# Patient Record
Sex: FEMALE | Race: OTHER | NOT HISPANIC OR LATINO
[De-identification: names, ages, dates, MRNs, and addresses within clinical notes are randomized per-mention and may not be internally consistent; named-entity substitution may affect disease eponyms.]

---

## 2017-01-05 ENCOUNTER — APPOINTMENT (OUTPATIENT)
Dept: INFECTIOUS DISEASE | Facility: CLINIC | Age: 77
End: 2017-01-05

## 2017-01-05 DIAGNOSIS — Z71.89 OTHER SPECIFIED COUNSELING: ICD-10-CM

## 2017-04-17 ENCOUNTER — APPOINTMENT (OUTPATIENT)
Dept: OBGYN | Facility: CLINIC | Age: 77
End: 2017-04-17

## 2017-04-17 VITALS
SYSTOLIC BLOOD PRESSURE: 121 MMHG | HEIGHT: 66 IN | BODY MASS INDEX: 26.2 KG/M2 | DIASTOLIC BLOOD PRESSURE: 73 MMHG | HEART RATE: 71 BPM | WEIGHT: 163 LBS

## 2017-04-18 LAB
APPEARANCE: CLEAR
BACTERIA: NEGATIVE
BILIRUBIN URINE: ABNORMAL
BLOOD URINE: NEGATIVE
CALCIUM OXALATE CRYSTALS: ABNORMAL
COLOR: ABNORMAL
GLUCOSE QUALITATIVE U: NORMAL MG/DL
HPV HIGH+LOW RISK DNA PNL CVX: POSITIVE
HYALINE CASTS: 0 /LPF
KETONES URINE: ABNORMAL
LEUKOCYTE ESTERASE URINE: NEGATIVE
MICROSCOPIC-UA: NORMAL
NITRITE URINE: NEGATIVE
PH URINE: 6.5
PROTEIN URINE: NEGATIVE MG/DL
RED BLOOD CELLS URINE: 2 /HPF
SPECIFIC GRAVITY URINE: 1.02
SQUAMOUS EPITHELIAL CELLS: 1 /HPF
UROBILINOGEN URINE: 1 MG/DL
WHITE BLOOD CELLS URINE: 1 /HPF

## 2017-04-19 LAB — BACTERIA UR CULT: NORMAL

## 2017-04-24 LAB — CYTOLOGY CVX/VAG DOC THIN PREP: NORMAL

## 2017-04-25 ENCOUNTER — OTHER (OUTPATIENT)
Age: 77
End: 2017-04-25

## 2017-04-25 ENCOUNTER — LABORATORY RESULT (OUTPATIENT)
Age: 77
End: 2017-04-25

## 2017-05-18 ENCOUNTER — APPOINTMENT (OUTPATIENT)
Dept: OBGYN | Facility: CLINIC | Age: 77
End: 2017-05-18

## 2017-05-18 VITALS
DIASTOLIC BLOOD PRESSURE: 82 MMHG | HEART RATE: 73 BPM | SYSTOLIC BLOOD PRESSURE: 123 MMHG | HEIGHT: 66 IN | BODY MASS INDEX: 25.88 KG/M2 | WEIGHT: 161 LBS

## 2017-05-24 LAB — CORE LAB BIOPSY: NORMAL

## 2017-09-08 ENCOUNTER — APPOINTMENT (OUTPATIENT)
Dept: ULTRASOUND IMAGING | Facility: IMAGING CENTER | Age: 77
End: 2017-09-08
Payer: MEDICARE

## 2017-09-08 ENCOUNTER — OUTPATIENT (OUTPATIENT)
Dept: OUTPATIENT SERVICES | Facility: HOSPITAL | Age: 77
LOS: 1 days | End: 2017-09-08
Payer: MEDICARE

## 2017-09-08 ENCOUNTER — APPOINTMENT (OUTPATIENT)
Dept: RADIOLOGY | Facility: IMAGING CENTER | Age: 77
End: 2017-09-08
Payer: MEDICARE

## 2017-09-08 ENCOUNTER — APPOINTMENT (OUTPATIENT)
Dept: MAMMOGRAPHY | Facility: IMAGING CENTER | Age: 77
End: 2017-09-08
Payer: MEDICARE

## 2017-09-08 DIAGNOSIS — Z00.8 ENCOUNTER FOR OTHER GENERAL EXAMINATION: ICD-10-CM

## 2017-09-08 PROCEDURE — 76830 TRANSVAGINAL US NON-OB: CPT | Mod: 26

## 2017-09-08 PROCEDURE — 77063 BREAST TOMOSYNTHESIS BI: CPT | Mod: 26

## 2017-09-08 PROCEDURE — 76856 US EXAM PELVIC COMPLETE: CPT | Mod: 26

## 2017-09-08 PROCEDURE — 77067 SCR MAMMO BI INCL CAD: CPT

## 2017-09-08 PROCEDURE — 76856 US EXAM PELVIC COMPLETE: CPT

## 2017-09-08 PROCEDURE — 77080 DXA BONE DENSITY AXIAL: CPT | Mod: 26

## 2017-09-08 PROCEDURE — 77063 BREAST TOMOSYNTHESIS BI: CPT

## 2017-09-08 PROCEDURE — 77080 DXA BONE DENSITY AXIAL: CPT

## 2017-09-08 PROCEDURE — G0202: CPT | Mod: 26

## 2017-09-08 PROCEDURE — 76830 TRANSVAGINAL US NON-OB: CPT

## 2017-09-14 DIAGNOSIS — M85.89 OTHER SPECIFIED DISORDERS OF BONE DENSITY AND STRUCTURE, MULTIPLE SITES: ICD-10-CM

## 2017-09-14 DIAGNOSIS — Z80.3 FAMILY HISTORY OF MALIGNANT NEOPLASM OF BREAST: ICD-10-CM

## 2017-09-14 DIAGNOSIS — N85.8 OTHER SPECIFIED NONINFLAMMATORY DISORDERS OF UTERUS: ICD-10-CM

## 2017-09-14 DIAGNOSIS — Z12.31 ENCOUNTER FOR SCREENING MAMMOGRAM FOR MALIGNANT NEOPLASM OF BREAST: ICD-10-CM

## 2017-10-20 ENCOUNTER — LABORATORY RESULT (OUTPATIENT)
Age: 77
End: 2017-10-20

## 2017-10-20 ENCOUNTER — APPOINTMENT (OUTPATIENT)
Dept: OBGYN | Facility: CLINIC | Age: 77
End: 2017-10-20
Payer: MEDICARE

## 2017-10-20 VITALS
HEART RATE: 77 BPM | DIASTOLIC BLOOD PRESSURE: 78 MMHG | BODY MASS INDEX: 25.88 KG/M2 | SYSTOLIC BLOOD PRESSURE: 120 MMHG | HEIGHT: 66 IN | WEIGHT: 161 LBS

## 2017-10-20 PROCEDURE — 99214 OFFICE O/P EST MOD 30 MIN: CPT

## 2017-10-23 LAB
APPEARANCE: CLEAR
BACTERIA UR CULT: NORMAL
BACTERIA: NEGATIVE
BILIRUBIN URINE: NEGATIVE
BLOOD URINE: NEGATIVE
CANDIDA VAG CYTO: NOT DETECTED
COLOR: YELLOW
G VAGINALIS+PREV SP MTYP VAG QL MICRO: NOT DETECTED
GLUCOSE QUALITATIVE U: NEGATIVE MG/DL
KETONES URINE: NEGATIVE
LEUKOCYTE ESTERASE URINE: NEGATIVE
MICROSCOPIC-UA: NORMAL
NITRITE URINE: NEGATIVE
PH URINE: 5.5
PROTEIN URINE: NEGATIVE MG/DL
RED BLOOD CELLS URINE: 1 /HPF
SPECIFIC GRAVITY URINE: 1.01
SQUAMOUS EPITHELIAL CELLS: 0 /HPF
T VAGINALIS VAG QL WET PREP: NOT DETECTED
UROBILINOGEN URINE: NEGATIVE MG/DL
WHITE BLOOD CELLS URINE: 0 /HPF

## 2017-10-25 LAB — CYTOLOGY CVX/VAG DOC THIN PREP: NORMAL

## 2017-10-26 LAB — CORE LAB FLUID CYTOLOGY: NORMAL

## 2017-10-27 ENCOUNTER — OTHER (OUTPATIENT)
Age: 77
End: 2017-10-27

## 2018-01-25 ENCOUNTER — APPOINTMENT (OUTPATIENT)
Dept: VASCULAR SURGERY | Facility: CLINIC | Age: 78
End: 2018-01-25
Payer: MEDICARE

## 2018-01-25 ENCOUNTER — MOBILE ON CALL (OUTPATIENT)
Age: 78
End: 2018-01-25

## 2018-01-25 VITALS
SYSTOLIC BLOOD PRESSURE: 144 MMHG | HEART RATE: 69 BPM | TEMPERATURE: 97.5 F | WEIGHT: 160 LBS | BODY MASS INDEX: 25.71 KG/M2 | HEIGHT: 66 IN | DIASTOLIC BLOOD PRESSURE: 80 MMHG

## 2018-01-25 VITALS — SYSTOLIC BLOOD PRESSURE: 142 MMHG | DIASTOLIC BLOOD PRESSURE: 90 MMHG | HEART RATE: 64 BPM

## 2018-01-25 DIAGNOSIS — R60.0 LOCALIZED EDEMA: ICD-10-CM

## 2018-01-25 PROCEDURE — 99203 OFFICE O/P NEW LOW 30 MIN: CPT

## 2018-01-25 PROCEDURE — 93970 EXTREMITY STUDY: CPT

## 2018-01-25 PROCEDURE — 93923 UPR/LXTR ART STDY 3+ LVLS: CPT

## 2018-02-02 PROBLEM — R60.0 BILATERAL LEG EDEMA: Status: ACTIVE | Noted: 2018-02-02

## 2018-04-09 ENCOUNTER — RX RENEWAL (OUTPATIENT)
Age: 78
End: 2018-04-09

## 2018-04-09 ENCOUNTER — APPOINTMENT (OUTPATIENT)
Dept: OBGYN | Facility: CLINIC | Age: 78
End: 2018-04-09
Payer: MEDICARE

## 2018-04-09 VITALS
HEART RATE: 71 BPM | HEIGHT: 66 IN | SYSTOLIC BLOOD PRESSURE: 146 MMHG | WEIGHT: 162 LBS | BODY MASS INDEX: 26.03 KG/M2 | DIASTOLIC BLOOD PRESSURE: 78 MMHG

## 2018-04-09 DIAGNOSIS — R87.619 UNSPECIFIED ABNORMAL CYTOLOGICAL FINDINGS IN SPECIMENS FROM CERVIX UTERI: ICD-10-CM

## 2018-04-09 PROCEDURE — 99214 OFFICE O/P EST MOD 30 MIN: CPT | Mod: 25

## 2018-04-09 PROCEDURE — G0101: CPT

## 2018-04-09 RX ORDER — ALPRAZOLAM 0.5 MG/1
0.5 TABLET ORAL
Qty: 2 | Refills: 0 | Status: COMPLETED | COMMUNITY
Start: 2018-04-09 | End: 2018-04-10

## 2018-04-13 ENCOUNTER — APPOINTMENT (OUTPATIENT)
Dept: VASCULAR SURGERY | Facility: CLINIC | Age: 78
End: 2018-04-13
Payer: MEDICARE

## 2018-04-13 LAB — CYTOLOGY CVX/VAG DOC THIN PREP: NORMAL

## 2018-04-13 PROCEDURE — 36475 ENDOVENOUS RF 1ST VEIN: CPT | Mod: RT

## 2018-04-14 LAB
HPV DNA HIGH RISK: NOT DETECTED
HPV DNA LOW RISK: NOT DETECTED

## 2018-04-16 ENCOUNTER — APPOINTMENT (OUTPATIENT)
Dept: VASCULAR SURGERY | Facility: CLINIC | Age: 78
End: 2018-04-16
Payer: MEDICARE

## 2018-04-16 PROCEDURE — 93971 EXTREMITY STUDY: CPT

## 2018-05-04 ENCOUNTER — RX RENEWAL (OUTPATIENT)
Age: 78
End: 2018-05-04

## 2018-05-04 RX ORDER — LIDOCAINE HYDROCHLORIDE 10 MG/ML
1 INJECTION, SOLUTION INFILTRATION; PERINEURAL
Qty: 50 | Refills: 0 | Status: COMPLETED | COMMUNITY
Start: 2018-04-09 | End: 2018-05-04

## 2018-05-04 RX ORDER — SODIUM BICARBONATE 84 MG/ML
8.4 INJECTION, SOLUTION INTRAVENOUS
Qty: 5 | Refills: 0 | Status: COMPLETED | COMMUNITY
Start: 2018-04-09 | End: 2018-05-04

## 2018-05-04 RX ORDER — SODIUM CHLORIDE 9 G/ML
0.9 INJECTION, SOLUTION INTRAVENOUS
Qty: 500 | Refills: 0 | Status: COMPLETED | COMMUNITY
Start: 2018-04-09 | End: 2018-05-04

## 2018-05-11 ENCOUNTER — APPOINTMENT (OUTPATIENT)
Dept: VASCULAR SURGERY | Facility: CLINIC | Age: 78
End: 2018-05-11
Payer: MEDICARE

## 2018-05-11 PROCEDURE — 36475 ENDOVENOUS RF 1ST VEIN: CPT | Mod: LT

## 2018-05-14 ENCOUNTER — APPOINTMENT (OUTPATIENT)
Dept: VASCULAR SURGERY | Facility: CLINIC | Age: 78
End: 2018-05-14
Payer: MEDICARE

## 2018-05-14 PROCEDURE — 93971 EXTREMITY STUDY: CPT

## 2018-05-17 ENCOUNTER — MOBILE ON CALL (OUTPATIENT)
Age: 78
End: 2018-05-17

## 2018-06-07 ENCOUNTER — APPOINTMENT (OUTPATIENT)
Dept: VASCULAR SURGERY | Facility: CLINIC | Age: 78
End: 2018-06-07
Payer: MEDICARE

## 2018-06-07 VITALS — DIASTOLIC BLOOD PRESSURE: 78 MMHG | HEART RATE: 69 BPM | SYSTOLIC BLOOD PRESSURE: 124 MMHG

## 2018-06-07 VITALS
WEIGHT: 160 LBS | DIASTOLIC BLOOD PRESSURE: 80 MMHG | HEART RATE: 62 BPM | HEIGHT: 66 IN | BODY MASS INDEX: 25.71 KG/M2 | TEMPERATURE: 98 F | SYSTOLIC BLOOD PRESSURE: 128 MMHG

## 2018-06-07 PROCEDURE — 99212 OFFICE O/P EST SF 10 MIN: CPT

## 2018-06-11 ENCOUNTER — MESSAGE (OUTPATIENT)
Age: 78
End: 2018-06-11

## 2018-06-13 ENCOUNTER — MESSAGE (OUTPATIENT)
Age: 78
End: 2018-06-13

## 2018-06-18 ENCOUNTER — APPOINTMENT (OUTPATIENT)
Dept: VASCULAR SURGERY | Facility: CLINIC | Age: 78
End: 2018-06-18

## 2018-06-29 ENCOUNTER — APPOINTMENT (OUTPATIENT)
Dept: VASCULAR SURGERY | Facility: CLINIC | Age: 78
End: 2018-06-29
Payer: MEDICARE

## 2018-06-29 PROCEDURE — 99215 OFFICE O/P EST HI 40 MIN: CPT

## 2018-07-06 ENCOUNTER — MESSAGE (OUTPATIENT)
Age: 78
End: 2018-07-06

## 2018-08-03 ENCOUNTER — APPOINTMENT (OUTPATIENT)
Dept: VASCULAR SURGERY | Facility: CLINIC | Age: 78
End: 2018-08-03
Payer: MEDICARE

## 2018-08-03 ENCOUNTER — NON-APPOINTMENT (OUTPATIENT)
Age: 78
End: 2018-08-03

## 2018-08-03 VITALS
TEMPERATURE: 97.9 F | HEIGHT: 66 IN | DIASTOLIC BLOOD PRESSURE: 77 MMHG | HEART RATE: 67 BPM | SYSTOLIC BLOOD PRESSURE: 122 MMHG

## 2018-08-03 PROCEDURE — 99213 OFFICE O/P EST LOW 20 MIN: CPT

## 2018-09-10 ENCOUNTER — APPOINTMENT (OUTPATIENT)
Dept: ULTRASOUND IMAGING | Facility: IMAGING CENTER | Age: 78
End: 2018-09-10

## 2018-09-10 ENCOUNTER — APPOINTMENT (OUTPATIENT)
Dept: MAMMOGRAPHY | Facility: IMAGING CENTER | Age: 78
End: 2018-09-10

## 2018-09-24 ENCOUNTER — APPOINTMENT (OUTPATIENT)
Dept: VASCULAR SURGERY | Facility: CLINIC | Age: 78
End: 2018-09-24
Payer: MEDICARE

## 2018-09-24 PROCEDURE — 99213 OFFICE O/P EST LOW 20 MIN: CPT

## 2018-10-01 ENCOUNTER — APPOINTMENT (OUTPATIENT)
Dept: MAMMOGRAPHY | Facility: IMAGING CENTER | Age: 78
End: 2018-10-01
Payer: MEDICARE

## 2018-10-01 ENCOUNTER — OUTPATIENT (OUTPATIENT)
Dept: OUTPATIENT SERVICES | Facility: HOSPITAL | Age: 78
LOS: 1 days | End: 2018-10-01
Payer: MEDICARE

## 2018-10-01 ENCOUNTER — APPOINTMENT (OUTPATIENT)
Dept: RADIOLOGY | Facility: IMAGING CENTER | Age: 78
End: 2018-10-01
Payer: MEDICARE

## 2018-10-01 DIAGNOSIS — Z00.8 ENCOUNTER FOR OTHER GENERAL EXAMINATION: ICD-10-CM

## 2018-10-01 PROCEDURE — 77063 BREAST TOMOSYNTHESIS BI: CPT | Mod: 26

## 2018-10-01 PROCEDURE — 77067 SCR MAMMO BI INCL CAD: CPT

## 2018-10-01 PROCEDURE — 77067 SCR MAMMO BI INCL CAD: CPT | Mod: 26

## 2018-10-01 PROCEDURE — 77063 BREAST TOMOSYNTHESIS BI: CPT

## 2018-10-11 ENCOUNTER — OUTPATIENT (OUTPATIENT)
Dept: OUTPATIENT SERVICES | Facility: HOSPITAL | Age: 78
LOS: 1 days | End: 2018-10-11
Payer: MEDICARE

## 2018-10-11 ENCOUNTER — APPOINTMENT (OUTPATIENT)
Dept: ULTRASOUND IMAGING | Facility: IMAGING CENTER | Age: 78
End: 2018-10-11
Payer: MEDICARE

## 2018-10-11 DIAGNOSIS — Z00.8 ENCOUNTER FOR OTHER GENERAL EXAMINATION: ICD-10-CM

## 2018-10-11 PROCEDURE — 76830 TRANSVAGINAL US NON-OB: CPT

## 2018-10-11 PROCEDURE — 76856 US EXAM PELVIC COMPLETE: CPT | Mod: 26

## 2018-10-11 PROCEDURE — 76856 US EXAM PELVIC COMPLETE: CPT

## 2018-10-11 PROCEDURE — 76830 TRANSVAGINAL US NON-OB: CPT | Mod: 26

## 2018-10-22 ENCOUNTER — APPOINTMENT (OUTPATIENT)
Dept: VASCULAR SURGERY | Facility: CLINIC | Age: 78
End: 2018-10-22
Payer: MEDICARE

## 2018-10-22 VITALS
WEIGHT: 159 LBS | DIASTOLIC BLOOD PRESSURE: 80 MMHG | BODY MASS INDEX: 25.66 KG/M2 | SYSTOLIC BLOOD PRESSURE: 126 MMHG | HEART RATE: 64 BPM

## 2018-10-22 DIAGNOSIS — I78.1 NEVUS, NON-NEOPLASTIC: ICD-10-CM

## 2018-10-22 DIAGNOSIS — I83.893 VARICOSE VEINS OF BILATERAL LOWER EXTREMITIES WITH OTHER COMPLICATIONS: ICD-10-CM

## 2018-10-22 PROCEDURE — 99212 OFFICE O/P EST SF 10 MIN: CPT

## 2018-10-29 ENCOUNTER — APPOINTMENT (OUTPATIENT)
Dept: OBGYN | Facility: CLINIC | Age: 78
End: 2018-10-29
Payer: MEDICARE

## 2018-10-29 VITALS
WEIGHT: 162 LBS | HEART RATE: 67 BPM | HEIGHT: 66 IN | BODY MASS INDEX: 26.03 KG/M2 | DIASTOLIC BLOOD PRESSURE: 82 MMHG | SYSTOLIC BLOOD PRESSURE: 132 MMHG

## 2018-10-29 PROCEDURE — 99214 OFFICE O/P EST MOD 30 MIN: CPT

## 2018-10-30 ENCOUNTER — CLINICAL ADVICE (OUTPATIENT)
Age: 78
End: 2018-10-30

## 2018-10-30 LAB
APPEARANCE: CLEAR
BACTERIA: NEGATIVE
BILIRUBIN URINE: NEGATIVE
BLOOD URINE: NEGATIVE
COLOR: YELLOW
GLUCOSE QUALITATIVE U: NEGATIVE MG/DL
HPV HIGH+LOW RISK DNA PNL CVX: DETECTED
KETONES URINE: NEGATIVE
LEUKOCYTE ESTERASE URINE: NEGATIVE
MICROSCOPIC-UA: NORMAL
NITRITE URINE: NEGATIVE
PH URINE: 5
PROTEIN URINE: NEGATIVE MG/DL
RED BLOOD CELLS URINE: 1 /HPF
SPECIFIC GRAVITY URINE: 1.02
SQUAMOUS EPITHELIAL CELLS: 0 /HPF
UROBILINOGEN URINE: NEGATIVE MG/DL
WHITE BLOOD CELLS URINE: 1 /HPF

## 2018-10-31 LAB — BACTERIA UR CULT: NORMAL

## 2018-11-01 LAB — CORE LAB FLUID CYTOLOGY: NORMAL

## 2018-11-04 LAB — CYTOLOGY CVX/VAG DOC THIN PREP: NORMAL

## 2018-11-06 ENCOUNTER — OTHER (OUTPATIENT)
Age: 78
End: 2018-11-06

## 2018-11-20 PROBLEM — I83.893 VARICOSE VEINS OF BILATERAL LOWER EXTREMITIES WITH OTHER COMPLICATIONS: Status: ACTIVE | Noted: 2018-02-02

## 2018-11-20 PROBLEM — I78.1 SPIDER VEINS: Status: ACTIVE | Noted: 2018-06-29

## 2019-04-22 ENCOUNTER — APPOINTMENT (OUTPATIENT)
Dept: OBGYN | Facility: CLINIC | Age: 79
End: 2019-04-22
Payer: MEDICARE

## 2019-04-22 VITALS
WEIGHT: 159 LBS | DIASTOLIC BLOOD PRESSURE: 81 MMHG | SYSTOLIC BLOOD PRESSURE: 130 MMHG | BODY MASS INDEX: 25.55 KG/M2 | HEART RATE: 69 BPM | HEIGHT: 66 IN

## 2019-04-22 PROCEDURE — 99214 OFFICE O/P EST MOD 30 MIN: CPT

## 2019-04-22 RX ORDER — ALPRAZOLAM 0.5 MG/1
0.5 TABLET ORAL
Qty: 1 | Refills: 0 | Status: DISCONTINUED | COMMUNITY
Start: 2018-05-04 | End: 2019-04-22

## 2019-04-22 NOTE — PHYSICAL EXAM
[No Acute Distress] : in no acute distress [Well developed] : well developed [Well Nourished] : ~L well nourished [Oriented x3] : oriented to person, place, and time [Good Hygeine] : demonstrates good hygeine [Normal Memory] : ~T memory was ~L unimpaired [Normal Mood/Affect] : mood and affect are normal [Normal Lung Sounds] : the lungs were clear to auscultation [Rate & Rhythm Regular] : ~T heart rate and rhythm were normal [Respirations regular] : ~T respiratory rate was regular [No Edema] : ~T edema was not present [Supple] : ~T the neck demonstrated no ~M decrease in suppleness [Symmetrical] : the neck was ~L symmetrical [Thyroid Normal] : the thyroid ~T showed no abnormalities [Warm and Dry] : was warm and dry to touch [Turgor Normal] : skin turgor ~T was normal [Vulvar Atrophy] : vulvar atrophy [Labia Minora] : were normal [Labia Majora] : were normal [Bartholin's Gland] : both Bartholin's glands were normal  [Normal Appearance] : general appearance was normal [Atrophy] : atrophy [3] : 3 [] : I [Uterine Adnexae] : were not tender and not enlarged [None] : no [Normal] : was normal [Normal rectal exam] : was normal [Mass] : no breast mass [Tender] : no tenderness [Nipple Discharge] : no nipple discharge [Mass (___ Cm)] : no ~M [unfilled] abdominal mass was palpated [Tenderness] : ~T no ~M abdominal tenderness observed [H/Smegaly] : no hepatosplenomegaly [Supraclavicular LAD] : no adenopathy noted in supraclavicular lymph nodes [Axillary LAD] : no adenopathy was noted in axillary nodes [Inguinal LAD] : no adenopathy was noted in the inguinal lymph nodes [Rash/Lesion] : no rash or lesion was noted [Estrogen Effect] : no estrogen effect was observed

## 2019-04-22 NOTE — REVIEW OF SYSTEMS
[All Other ROS] : all other reviewed systems are negative [FreeTextEntry1] : MITRAL VALVE REPAIRED 2/16

## 2019-04-22 NOTE — DISCUSSION/SUMMARY
[Reviewed Clinical Lab Test(s)] : Results of clinical tests were reviewed. [Reviewed Radiology Report(s)] : Radiology reports were reviewed. [Discuss Alternatives/Risks/Benefits w/Patient] : All alternatives, risks, and benefits were discussed with the patient/family and all questions were answered.  Patient expressed good understanding and appreciates the importance of follow up as recommended.

## 2019-04-22 NOTE — HISTORY OF PRESENT ILLNESS
[Sexual Dysfunction, NOS] : none [Stress Incontinence] : mild [Urinary Frequency] : moderate [Uterine Prolapse] : uterine prolapse [Cystocele (Obstetric)] : bladder prolapse [Rectal Prolapse] : rectal prolapse [Vaginal Wall Prolapse] : vaginal prolapse [Constipation Obstructed Defecation] : constipation [Incomplete Emptying Of Stool] : incomplete defecation [Diarrhea] : diarrhea [Urge Incontinence Of Urine] : urge incontinence [Stool Visible Blood] : blood in the stool [Unable To Restrain Bowel Movement] : fecal incontinence [Feelings Of Urinary Urgency] : urinary urgency [Pain During Urination (Dysuria)] : dysuria [Urinary Tract Infection] : urinary tract infection [Incomplete Emptying Of Bladder] : incomplete empyting of bladder [Hematuria] : hematuria [Urinary Stream Starts And Stops] : intermittent urine stream [Urinary Frequency More Than Twice At Night (Nocturia)] : nocturia [Pelvic Pain] : pelvic pain [Vaginal Pain] : vaginal pain [Vulvar Pain] : vulva pain [Back Pain] : flank/back pain [Bladder Pain] : bladder pain [Rectal Pain] : rectal pain [] : sexual desire dysfunction [FreeTextEntry1] : Occasional stress and urge incontinence is doing Kegel exercises on her own and tried formal physical therapy and is now doing this on her own\par Lichen sclerosus on clobetasol feeling better\par Vulvar itching is basically resolved \par Estrogen cream twice a week and a quarter to have a gram only

## 2019-04-22 NOTE — CHIEF COMPLAINT
[Questionnaire Received] : Patient questionnaire received [Urine Frequency] : urine frequency [Blood In Urine] : blood in urine

## 2019-04-22 NOTE — COUNSELING
[FreeTextEntry1] : #1 MITRAL VALVE REPAIR DOING WELL\par #2 COLO PER GI AND CARDIAC SURGEON (2016)-NORMAL-done nl rpt 2020 or 2021 manish procacino\par #3 MAMMO normal 2018 rpt 2019\par #4 PELVIC SONO TA TV 2018 nl rpt 2019\par #5 DEXA 2017 normal rpt 5492-7611\par #6 PCP UP TO DATE (Alessandro Keyes)\par #7 PAP, HPV SENT 2018 neg pap +hpv rpt fall 2019\par #8 UA, UC sent due to past hx microhematuria\par #9 LU/UUI STABLE OCC FREQ URGE -PT REFERRAL GIVEN STARS PT/PTNS booklet given\par #10 NO POP SX\par #11 CLOBETASOL USE WEEKLY AT NIGHT/BEDTIME ESTRACE 2x week (Mild lichen sclerosis and pm atrophy)\par #12 ALL QUES ANSWERED\par #13 RETURN 6 mosOr sooner as clinically indicated\par #14 If the Pap or HPV is aor high-riskand Dr. Antonio will repeat the colposcopy and if she cannot get  good sample on the ECC this will be done using cervical dilation per her note

## 2019-04-23 LAB
APPEARANCE: CLEAR
BACTERIA: NEGATIVE
BILIRUBIN URINE: NEGATIVE
BLOOD URINE: NEGATIVE
COLOR: NORMAL
GLUCOSE QUALITATIVE U: NEGATIVE
HYALINE CASTS: 0 /LPF
KETONES URINE: NEGATIVE
LEUKOCYTE ESTERASE URINE: NEGATIVE
MICROSCOPIC-UA: NORMAL
NITRITE URINE: NEGATIVE
PH URINE: 5.5
PROTEIN URINE: NEGATIVE
RED BLOOD CELLS URINE: 2 /HPF
SPECIFIC GRAVITY URINE: 1.01
SQUAMOUS EPITHELIAL CELLS: 0 /HPF
URINE CYTOLOGY: NORMAL
UROBILINOGEN URINE: NORMAL
WHITE BLOOD CELLS URINE: 0 /HPF

## 2019-04-24 LAB — BACTERIA UR CULT: NORMAL

## 2019-06-13 ENCOUNTER — OTHER (OUTPATIENT)
Age: 79
End: 2019-06-13

## 2019-06-13 DIAGNOSIS — R10.2 PELVIC AND PERINEAL PAIN: ICD-10-CM

## 2019-06-21 ENCOUNTER — APPOINTMENT (OUTPATIENT)
Dept: OBGYN | Facility: CLINIC | Age: 79
End: 2019-06-21
Payer: MEDICARE

## 2019-06-21 VITALS — DIASTOLIC BLOOD PRESSURE: 82 MMHG | HEIGHT: 66 IN | SYSTOLIC BLOOD PRESSURE: 132 MMHG | HEART RATE: 51 BPM

## 2019-06-21 PROCEDURE — 99214 OFFICE O/P EST MOD 30 MIN: CPT | Mod: 25

## 2019-06-21 PROCEDURE — 51798 US URINE CAPACITY MEASURE: CPT

## 2019-06-21 PROCEDURE — 64566 NEUROELTRD STIM POST TIBIAL: CPT

## 2019-06-25 ENCOUNTER — APPOINTMENT (OUTPATIENT)
Dept: ULTRASOUND IMAGING | Facility: IMAGING CENTER | Age: 79
End: 2019-06-25
Payer: MEDICARE

## 2019-06-25 ENCOUNTER — OUTPATIENT (OUTPATIENT)
Dept: OUTPATIENT SERVICES | Facility: HOSPITAL | Age: 79
LOS: 1 days | End: 2019-06-25
Payer: MEDICARE

## 2019-06-25 ENCOUNTER — APPOINTMENT (OUTPATIENT)
Dept: RADIOLOGY | Facility: IMAGING CENTER | Age: 79
End: 2019-06-25
Payer: MEDICARE

## 2019-06-25 DIAGNOSIS — R10.2 PELVIC AND PERINEAL PAIN: ICD-10-CM

## 2019-06-25 PROCEDURE — 76830 TRANSVAGINAL US NON-OB: CPT

## 2019-06-25 PROCEDURE — 76856 US EXAM PELVIC COMPLETE: CPT

## 2019-06-25 PROCEDURE — 76856 US EXAM PELVIC COMPLETE: CPT | Mod: 26

## 2019-06-25 PROCEDURE — 76830 TRANSVAGINAL US NON-OB: CPT | Mod: 26

## 2019-06-25 PROCEDURE — 76770 US EXAM ABDO BACK WALL COMP: CPT | Mod: 26

## 2019-06-25 PROCEDURE — 76770 US EXAM ABDO BACK WALL COMP: CPT

## 2019-06-28 ENCOUNTER — APPOINTMENT (OUTPATIENT)
Dept: OBGYN | Facility: CLINIC | Age: 79
End: 2019-06-28
Payer: MEDICARE

## 2019-06-28 VITALS
HEART RATE: 65 BPM | HEIGHT: 66 IN | DIASTOLIC BLOOD PRESSURE: 68 MMHG | SYSTOLIC BLOOD PRESSURE: 101 MMHG | BODY MASS INDEX: 26.68 KG/M2 | WEIGHT: 166 LBS

## 2019-06-28 PROCEDURE — 99214 OFFICE O/P EST MOD 30 MIN: CPT | Mod: 25

## 2019-06-28 PROCEDURE — 64566 NEUROELTRD STIM POST TIBIAL: CPT

## 2019-06-28 PROCEDURE — 51798 US URINE CAPACITY MEASURE: CPT

## 2019-07-05 ENCOUNTER — APPOINTMENT (OUTPATIENT)
Dept: OBGYN | Facility: CLINIC | Age: 79
End: 2019-07-05
Payer: MEDICARE

## 2019-07-05 VITALS — HEIGHT: 66 IN | SYSTOLIC BLOOD PRESSURE: 108 MMHG | HEART RATE: 73 BPM | DIASTOLIC BLOOD PRESSURE: 69 MMHG

## 2019-07-05 PROCEDURE — 51798 US URINE CAPACITY MEASURE: CPT

## 2019-07-05 PROCEDURE — 99214 OFFICE O/P EST MOD 30 MIN: CPT | Mod: 25

## 2019-07-05 PROCEDURE — 64566 NEUROELTRD STIM POST TIBIAL: CPT

## 2019-07-12 ENCOUNTER — APPOINTMENT (OUTPATIENT)
Dept: OBGYN | Facility: CLINIC | Age: 79
End: 2019-07-12
Payer: MEDICARE

## 2019-07-12 VITALS
HEIGHT: 66 IN | BODY MASS INDEX: 31.72 KG/M2 | SYSTOLIC BLOOD PRESSURE: 127 MMHG | DIASTOLIC BLOOD PRESSURE: 81 MMHG | HEART RATE: 67 BPM | WEIGHT: 197.38 LBS

## 2019-07-12 VITALS
DIASTOLIC BLOOD PRESSURE: 73 MMHG | WEIGHT: 166 LBS | HEIGHT: 66 IN | BODY MASS INDEX: 26.68 KG/M2 | SYSTOLIC BLOOD PRESSURE: 115 MMHG | HEART RATE: 74 BPM

## 2019-07-12 PROCEDURE — 99214 OFFICE O/P EST MOD 30 MIN: CPT | Mod: 25

## 2019-07-12 PROCEDURE — 51798 US URINE CAPACITY MEASURE: CPT

## 2019-07-12 PROCEDURE — 64566 NEUROELTRD STIM POST TIBIAL: CPT

## 2019-07-19 ENCOUNTER — APPOINTMENT (OUTPATIENT)
Dept: OBGYN | Facility: CLINIC | Age: 79
End: 2019-07-19
Payer: MEDICARE

## 2019-07-19 VITALS
BODY MASS INDEX: 31.66 KG/M2 | HEART RATE: 70 BPM | DIASTOLIC BLOOD PRESSURE: 81 MMHG | SYSTOLIC BLOOD PRESSURE: 124 MMHG | HEIGHT: 66 IN | WEIGHT: 197 LBS

## 2019-07-19 PROCEDURE — 64566 NEUROELTRD STIM POST TIBIAL: CPT

## 2019-07-26 ENCOUNTER — APPOINTMENT (OUTPATIENT)
Dept: OBGYN | Facility: CLINIC | Age: 79
End: 2019-07-26
Payer: MEDICARE

## 2019-07-26 VITALS
BODY MASS INDEX: 25.55 KG/M2 | HEIGHT: 66 IN | WEIGHT: 159 LBS | DIASTOLIC BLOOD PRESSURE: 71 MMHG | SYSTOLIC BLOOD PRESSURE: 114 MMHG

## 2019-07-26 DIAGNOSIS — N84.1 POLYP OF CERVIX UTERI: ICD-10-CM

## 2019-07-26 PROCEDURE — 64566 NEUROELTRD STIM POST TIBIAL: CPT

## 2019-07-26 PROCEDURE — 99214 OFFICE O/P EST MOD 30 MIN: CPT | Mod: 25

## 2019-08-02 ENCOUNTER — APPOINTMENT (OUTPATIENT)
Dept: OBGYN | Facility: CLINIC | Age: 79
End: 2019-08-02
Payer: MEDICARE

## 2019-08-02 VITALS
BODY MASS INDEX: 41.46 KG/M2 | HEART RATE: 65 BPM | HEIGHT: 66 IN | DIASTOLIC BLOOD PRESSURE: 76 MMHG | WEIGHT: 258 LBS | SYSTOLIC BLOOD PRESSURE: 117 MMHG

## 2019-08-02 PROCEDURE — 51798 US URINE CAPACITY MEASURE: CPT

## 2019-08-02 PROCEDURE — 99214 OFFICE O/P EST MOD 30 MIN: CPT | Mod: 25

## 2019-08-02 PROCEDURE — 64566 NEUROELTRD STIM POST TIBIAL: CPT

## 2019-08-16 ENCOUNTER — APPOINTMENT (OUTPATIENT)
Dept: OBGYN | Facility: CLINIC | Age: 79
End: 2019-08-16
Payer: MEDICARE

## 2019-08-16 VITALS
SYSTOLIC BLOOD PRESSURE: 122 MMHG | HEART RATE: 56 BPM | DIASTOLIC BLOOD PRESSURE: 77 MMHG | BODY MASS INDEX: 25.71 KG/M2 | WEIGHT: 160 LBS | HEIGHT: 66 IN

## 2019-08-16 PROCEDURE — 99214 OFFICE O/P EST MOD 30 MIN: CPT | Mod: 25

## 2019-08-16 PROCEDURE — 64566 NEUROELTRD STIM POST TIBIAL: CPT

## 2019-08-23 ENCOUNTER — APPOINTMENT (OUTPATIENT)
Dept: OBGYN | Facility: CLINIC | Age: 79
End: 2019-08-23
Payer: MEDICARE

## 2019-08-23 VITALS
WEIGHT: 158 LBS | HEART RATE: 59 BPM | HEIGHT: 66 IN | SYSTOLIC BLOOD PRESSURE: 116 MMHG | DIASTOLIC BLOOD PRESSURE: 71 MMHG | BODY MASS INDEX: 25.39 KG/M2

## 2019-08-23 PROCEDURE — 99214 OFFICE O/P EST MOD 30 MIN: CPT | Mod: 25

## 2019-08-23 PROCEDURE — 64566 NEUROELTRD STIM POST TIBIAL: CPT

## 2019-08-26 ENCOUNTER — RESULT REVIEW (OUTPATIENT)
Age: 79
End: 2019-08-26

## 2019-09-09 ENCOUNTER — APPOINTMENT (OUTPATIENT)
Dept: OBGYN | Facility: CLINIC | Age: 79
End: 2019-09-09

## 2019-09-13 ENCOUNTER — APPOINTMENT (OUTPATIENT)
Dept: OBGYN | Facility: CLINIC | Age: 79
End: 2019-09-13
Payer: MEDICARE

## 2019-09-13 VITALS
BODY MASS INDEX: 25.39 KG/M2 | DIASTOLIC BLOOD PRESSURE: 65 MMHG | HEART RATE: 67 BPM | HEIGHT: 66 IN | WEIGHT: 158 LBS | SYSTOLIC BLOOD PRESSURE: 107 MMHG

## 2019-09-13 PROCEDURE — 99214 OFFICE O/P EST MOD 30 MIN: CPT | Mod: 25

## 2019-09-13 PROCEDURE — 64566 NEUROELTRD STIM POST TIBIAL: CPT

## 2019-09-13 PROCEDURE — 51798 US URINE CAPACITY MEASURE: CPT

## 2019-09-20 ENCOUNTER — APPOINTMENT (OUTPATIENT)
Dept: OBGYN | Facility: CLINIC | Age: 79
End: 2019-09-20
Payer: MEDICARE

## 2019-09-20 PROCEDURE — 64566 NEUROELTRD STIM POST TIBIAL: CPT

## 2019-09-20 PROCEDURE — 51798 US URINE CAPACITY MEASURE: CPT

## 2019-09-20 PROCEDURE — 99214 OFFICE O/P EST MOD 30 MIN: CPT | Mod: 25

## 2019-10-07 ENCOUNTER — OUTPATIENT (OUTPATIENT)
Dept: OUTPATIENT SERVICES | Facility: HOSPITAL | Age: 79
LOS: 1 days | End: 2019-10-07
Payer: MEDICARE

## 2019-10-07 ENCOUNTER — APPOINTMENT (OUTPATIENT)
Dept: MAMMOGRAPHY | Facility: IMAGING CENTER | Age: 79
End: 2019-10-07
Payer: MEDICARE

## 2019-10-07 ENCOUNTER — APPOINTMENT (OUTPATIENT)
Dept: ULTRASOUND IMAGING | Facility: IMAGING CENTER | Age: 79
End: 2019-10-07
Payer: MEDICARE

## 2019-10-07 ENCOUNTER — APPOINTMENT (OUTPATIENT)
Dept: RADIOLOGY | Facility: IMAGING CENTER | Age: 79
End: 2019-10-07
Payer: MEDICARE

## 2019-10-07 ENCOUNTER — APPOINTMENT (OUTPATIENT)
Dept: OBGYN | Facility: CLINIC | Age: 79
End: 2019-10-07
Payer: MEDICARE

## 2019-10-07 VITALS
BODY MASS INDEX: 25.39 KG/M2 | WEIGHT: 158 LBS | SYSTOLIC BLOOD PRESSURE: 123 MMHG | HEIGHT: 66 IN | DIASTOLIC BLOOD PRESSURE: 68 MMHG | HEART RATE: 80 BPM

## 2019-10-07 DIAGNOSIS — M85.80 OTHER SPECIFIED DISORDERS OF BONE DENSITY AND STRUCTURE, UNSPECIFIED SITE: ICD-10-CM

## 2019-10-07 PROCEDURE — 77080 DXA BONE DENSITY AXIAL: CPT | Mod: 26

## 2019-10-07 PROCEDURE — 77063 BREAST TOMOSYNTHESIS BI: CPT | Mod: 26

## 2019-10-07 PROCEDURE — 76641 ULTRASOUND BREAST COMPLETE: CPT | Mod: 26,50

## 2019-10-07 PROCEDURE — 77063 BREAST TOMOSYNTHESIS BI: CPT

## 2019-10-07 PROCEDURE — 99214 OFFICE O/P EST MOD 30 MIN: CPT | Mod: 25

## 2019-10-07 PROCEDURE — 77067 SCR MAMMO BI INCL CAD: CPT | Mod: 26

## 2019-10-07 PROCEDURE — 77080 DXA BONE DENSITY AXIAL: CPT

## 2019-10-07 PROCEDURE — 51798 US URINE CAPACITY MEASURE: CPT

## 2019-10-07 PROCEDURE — 76641 ULTRASOUND BREAST COMPLETE: CPT

## 2019-10-07 PROCEDURE — 77067 SCR MAMMO BI INCL CAD: CPT

## 2019-10-07 PROCEDURE — 64566 NEUROELTRD STIM POST TIBIAL: CPT

## 2019-10-22 ENCOUNTER — OTHER (OUTPATIENT)
Age: 79
End: 2019-10-22

## 2019-11-11 ENCOUNTER — APPOINTMENT (OUTPATIENT)
Dept: OBGYN | Facility: CLINIC | Age: 79
End: 2019-11-11
Payer: MEDICARE

## 2019-11-11 VITALS
HEIGHT: 66 IN | SYSTOLIC BLOOD PRESSURE: 122 MMHG | HEART RATE: 67 BPM | BODY MASS INDEX: 26.36 KG/M2 | DIASTOLIC BLOOD PRESSURE: 78 MMHG | WEIGHT: 164 LBS

## 2019-11-11 LAB
APPEARANCE: CLEAR
BACTERIA: NEGATIVE
BILIRUBIN URINE: NEGATIVE
BLOOD URINE: NEGATIVE
COLOR: YELLOW
GLUCOSE QUALITATIVE U: NEGATIVE
HYALINE CASTS: 2 /LPF
KETONES URINE: NEGATIVE
LEUKOCYTE ESTERASE URINE: NEGATIVE
MICROSCOPIC-UA: NORMAL
NITRITE URINE: NEGATIVE
PH URINE: 6.5
PROTEIN URINE: NORMAL
RED BLOOD CELLS URINE: 5 /HPF
SPECIFIC GRAVITY URINE: 1.03
SQUAMOUS EPITHELIAL CELLS: 1 /HPF
UROBILINOGEN URINE: NORMAL
WHITE BLOOD CELLS URINE: 2 /HPF

## 2019-11-11 PROCEDURE — 99214 OFFICE O/P EST MOD 30 MIN: CPT

## 2019-11-11 NOTE — COUNSELING
[FreeTextEntry1] : #1 MITRAL VALVE REPAIR DOING WELL\par #2 COLO PER GI AND CARDIAC SURGEON (2016)-NORMAL-done nl rpt 2020 or 2021 manish sanchez\par #3 MAMMO normal 2019 with sono and rpt 2020\par #4 PELVIC SONO TA TV 2019 nl rpt 2020 WITH RENAL SONO-normal 2019 rpt 2020\par #5 DEXA 2019 no sig change-mild osteopenia-ex and vit d and rpt 2 years\par #6 PCP UP TO DATE (Alessandro Keyes)\par #7 PAP, HPV SENT 2018 neg pap +hpv rpt fall 2019 november rpted today 11/11/19\par #8 UA, UC sent due to past hx microhematuria-neg pvr normal\par #9 LU/UUI STABLE OCC FREQ URGE -PT REFERRAL GIVEN STARS PT/PTNS 10/7/19 RX APPOINTMENTS MADE on boosters now prn up to 12 rx uuisui<<<\par #10 NO POP SX\par #11 CLOBETASOL USE WEEKLY AT NIGHT/BEDTIME (Mild lichen sclerosis and pm atrophy)-stable qwk use\par #12 ALL QUES ANSWERED\par #13 RETURN 6 mosOr sooner as clinically indicated and for ptns boosters booked\par #14 If the Pap or HPV is aor high-riskand  (carri as Dr. wright is moving on to [facility]) will repeat the colposcopy and if she cannot get  good sample on the ECC this will be done using cervical dilation per her note\par #15 biannual visit may 2020\par #16 uc ua cytol sent

## 2019-11-11 NOTE — DISCUSSION/SUMMARY
[Reviewed Radiology Report(s)] : Radiology reports were reviewed. [Reviewed Clinical Lab Test(s)] : Results of clinical tests were reviewed. [Discuss Alternatives/Risks/Benefits w/Patient] : All alternatives, risks, and benefits were discussed with the patient/family and all questions were answered.  Patient expressed good understanding and appreciates the importance of follow up as recommended.

## 2019-11-11 NOTE — HISTORY OF PRESENT ILLNESS
[Sexual Dysfunction, NOS] : none [Stress Incontinence] : mild [Urinary Frequency] : moderate [Uterine Prolapse] : uterine prolapse [Cystocele (Obstetric)] : bladder prolapse [Vaginal Wall Prolapse] : vaginal prolapse [Rectal Prolapse] : rectal prolapse [Constipation Obstructed Defecation] : constipation [Incomplete Emptying Of Stool] : incomplete defecation [Diarrhea] : diarrhea [Stool Visible Blood] : blood in the stool [Unable To Restrain Bowel Movement] : fecal incontinence [Feelings Of Urinary Urgency] : urinary urgency [Urge Incontinence Of Urine] : urge incontinence [Pain During Urination (Dysuria)] : dysuria [Urinary Tract Infection] : urinary tract infection [Incomplete Emptying Of Bladder] : incomplete empyting of bladder [Hematuria] : hematuria [Urinary Stream Starts And Stops] : intermittent urine stream [Pelvic Pain] : pelvic pain [Urinary Frequency More Than Twice At Night (Nocturia)] : nocturia [Vaginal Pain] : vaginal pain [Vulvar Pain] : vulva pain [Bladder Pain] : bladder pain [Rectal Pain] : rectal pain [] : sexual desire dysfunction [Back Pain] : flank/back pain [FreeTextEntry1] : Occasional stress and urge incontinence is doing Kegel exercises on her own and tried formal physical therapy and is now doing this on her own\par Lichen sclerosus on clobetasol feeling better\par Vulvar itching is basically resolved \par Estrogen cream twice a week and a quarter to have a gram only

## 2019-11-11 NOTE — PHYSICAL EXAM
[No Acute Distress] : in no acute distress [Well Nourished] : ~L well nourished [Well developed] : well developed [Good Hygeine] : demonstrates good hygeine [Oriented x3] : oriented to person, place, and time [Normal Memory] : ~T memory was ~L unimpaired [Normal Lung Sounds] : the lungs were clear to auscultation [Normal Mood/Affect] : mood and affect are normal [Respirations regular] : ~T respiratory rate was regular [Rate & Rhythm Regular] : ~T heart rate and rhythm were normal [No Edema] : ~T edema was not present [Supple] : ~T the neck demonstrated no ~M decrease in suppleness [Thyroid Normal] : the thyroid ~T showed no abnormalities [Symmetrical] : the neck was ~L symmetrical [Warm and Dry] : was warm and dry to touch [Turgor Normal] : skin turgor ~T was normal [Vulvar Atrophy] : vulvar atrophy [Labia Minora] : were normal [Labia Majora] : were normal [Bartholin's Gland] : both Bartholin's glands were normal  [Atrophy] : atrophy [Normal Appearance] : general appearance was normal [3] : 3 [] : I [Uterine Adnexae] : were not tender and not enlarged [Normal] : was normal [Normal rectal exam] : was normal [None] : no [Mass] : no breast mass [Tender] : no tenderness [Nipple Discharge] : no nipple discharge [Mass (___ Cm)] : no ~M [unfilled] abdominal mass was palpated [Tenderness] : ~T no ~M abdominal tenderness observed [H/Smegaly] : no hepatosplenomegaly [Supraclavicular LAD] : no adenopathy noted in supraclavicular lymph nodes [Axillary LAD] : no adenopathy was noted in axillary nodes [Inguinal LAD] : no adenopathy was noted in the inguinal lymph nodes [Rash/Lesion] : no rash or lesion was noted [Estrogen Effect] : no estrogen effect was observed

## 2019-11-11 NOTE — PROCEDURE
[Unable to tolerate drug therapy] : because she is unable to tolerate drug therapy [Failed Kegel Exercises] : due to kegel exercises which have failed [Urge Incontinence] : urge incontinence [Urgency] : urinary urgency [Frequency] : urinary frequency [#12] : treatment #12 [Patient Tolerated Treatment] : patient tolerated treatment well [Appropriate Reflexes Elicited] : appropriate reflexes were elicited [Treatment cycle completed] : treatment cycle was completed [Adverse Reactions] : no adverse reactions, [FreeTextEntry1] : right ankle at #8 SETTING

## 2019-11-11 NOTE — REVIEW OF SYSTEMS
[Eyesight Problems] : eyesight problems [All Other ROS] : all other reviewed systems are negative [FreeTextEntry1] : MITRAL VALVE REPAIRED 2/16

## 2019-11-12 LAB — BACTERIA UR CULT: NORMAL

## 2019-11-13 LAB — HPV HIGH+LOW RISK DNA PNL CVX: DETECTED

## 2019-11-16 LAB — URINE CYTOLOGY: NORMAL

## 2019-11-18 LAB — CYTOLOGY CVX/VAG DOC THIN PREP: ABNORMAL

## 2019-11-26 ENCOUNTER — APPOINTMENT (OUTPATIENT)
Dept: OBGYN | Facility: CLINIC | Age: 79
End: 2019-11-26
Payer: MEDICARE

## 2019-11-26 VITALS
DIASTOLIC BLOOD PRESSURE: 70 MMHG | HEART RATE: 77 BPM | OXYGEN SATURATION: 99 % | WEIGHT: 158 LBS | HEIGHT: 66 IN | SYSTOLIC BLOOD PRESSURE: 120 MMHG | BODY MASS INDEX: 25.39 KG/M2

## 2019-11-26 PROCEDURE — 57456 ENDOCERV CURETTAGE W/SCOPE: CPT

## 2019-11-26 RX ORDER — AZITHROMYCIN 250 MG/1
250 TABLET, FILM COATED ORAL
Qty: 4 | Refills: 0 | Status: DISCONTINUED | COMMUNITY
Start: 2017-01-05 | End: 2019-11-26

## 2019-11-26 RX ORDER — UBIDECARENONE/VIT E ACET 100MG-5
CAPSULE ORAL
Refills: 0 | Status: ACTIVE | COMMUNITY

## 2019-11-26 RX ORDER — CLINDAMYCIN HYDROCHLORIDE 150 MG/1
150 CAPSULE ORAL
Qty: 12 | Refills: 0 | Status: DISCONTINUED | COMMUNITY
Start: 2019-04-08 | End: 2019-11-26

## 2019-11-26 RX ORDER — SODIUM CHLORIDE 9 G/ML
0.9 INJECTION, SOLUTION INTRAVENOUS
Qty: 500 | Refills: 0 | Status: DISCONTINUED | COMMUNITY
Start: 2018-05-04 | End: 2019-11-26

## 2019-11-26 RX ORDER — LIDOCAINE HYDROCHLORIDE 10 MG/ML
1 INJECTION, SOLUTION INFILTRATION; PERINEURAL
Qty: 50 | Refills: 0 | Status: DISCONTINUED | COMMUNITY
Start: 2018-05-04 | End: 2019-11-26

## 2019-11-26 RX ORDER — SODIUM BICARBONATE 84 MG/ML
8.4 INJECTION, SOLUTION INTRAVENOUS
Qty: 5 | Refills: 0 | Status: DISCONTINUED | COMMUNITY
Start: 2018-05-04 | End: 2019-11-26

## 2019-11-26 NOTE — PROCEDURE
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  [Biopsies Taken: # ___] : no biopsies were taken of the cervix [Sienna's] : Sienna's solution [Tolerated Well] : the patient tolerated the procedure well [No Complications] : there were no complications

## 2019-12-04 LAB — CORE LAB BIOPSY: NORMAL

## 2020-01-27 ENCOUNTER — APPOINTMENT (OUTPATIENT)
Dept: OBGYN | Facility: CLINIC | Age: 80
End: 2020-01-27
Payer: MEDICARE

## 2020-01-27 VITALS
WEIGHT: 160 LBS | BODY MASS INDEX: 25.71 KG/M2 | HEIGHT: 66 IN | HEART RATE: 88 BPM | DIASTOLIC BLOOD PRESSURE: 76 MMHG | SYSTOLIC BLOOD PRESSURE: 126 MMHG

## 2020-01-27 LAB
APPEARANCE: CLEAR
BACTERIA: NEGATIVE
BILIRUBIN URINE: NEGATIVE
BLOOD URINE: NEGATIVE
COLOR: NORMAL
GLUCOSE QUALITATIVE U: NEGATIVE
HYALINE CASTS: 0 /LPF
KETONES URINE: NEGATIVE
LEUKOCYTE ESTERASE URINE: NEGATIVE
MICROSCOPIC-UA: NORMAL
NITRITE URINE: NEGATIVE
PH URINE: 6.5
PROTEIN URINE: NEGATIVE
RED BLOOD CELLS URINE: 3 /HPF
SPECIFIC GRAVITY URINE: 1.02
SQUAMOUS EPITHELIAL CELLS: 0 /HPF
UROBILINOGEN URINE: NORMAL
WHITE BLOOD CELLS URINE: 0 /HPF

## 2020-01-27 PROCEDURE — 51798 US URINE CAPACITY MEASURE: CPT

## 2020-01-27 PROCEDURE — 64566 NEUROELTRD STIM POST TIBIAL: CPT

## 2020-01-27 PROCEDURE — 99214 OFFICE O/P EST MOD 30 MIN: CPT | Mod: 25

## 2020-01-27 NOTE — HISTORY OF PRESENT ILLNESS
[Sexual Dysfunction, NOS] : none [Stress Incontinence] : mild [Urinary Frequency] : moderate [Cystocele (Obstetric)] : bladder prolapse [Uterine Prolapse] : uterine prolapse [Vaginal Wall Prolapse] : vaginal prolapse [Rectal Prolapse] : rectal prolapse [Constipation Obstructed Defecation] : constipation [Incomplete Emptying Of Stool] : incomplete defecation [Stool Visible Blood] : blood in the stool [Diarrhea] : diarrhea [Urge Incontinence Of Urine] : urge incontinence [Unable To Restrain Bowel Movement] : fecal incontinence [Feelings Of Urinary Urgency] : urinary urgency [Pain During Urination (Dysuria)] : dysuria [Urinary Tract Infection] : urinary tract infection [Hematuria] : hematuria [Incomplete Emptying Of Bladder] : incomplete empyting of bladder [Urinary Stream Starts And Stops] : intermittent urine stream [Urinary Frequency More Than Twice At Night (Nocturia)] : nocturia [Pelvic Pain] : pelvic pain [Vaginal Pain] : vaginal pain [Vulvar Pain] : vulva pain [Bladder Pain] : bladder pain [Rectal Pain] : rectal pain [Back Pain] : flank/back pain [] : sexual desire dysfunction [FreeTextEntry1] : Occasional stress and urge incontinence is doing Kegel exercises on her own and tried formal physical therapy and is now doing this on her own\par Lichen sclerosus on clobetasol feeling better\par Vulvar itching is basically resolved \par Estrogen cream twice a week and a quarter to have a gram only

## 2020-01-27 NOTE — PHYSICAL EXAM
[No Acute Distress] : in no acute distress [Well developed] : well developed [Well Nourished] : ~L well nourished [Good Hygeine] : demonstrates good hygeine [Oriented x3] : oriented to person, place, and time [Normal Memory] : ~T memory was ~L unimpaired [Normal Mood/Affect] : mood and affect are normal [Normal Lung Sounds] : the lungs were clear to auscultation [Respirations regular] : ~T respiratory rate was regular [Rate & Rhythm Regular] : ~T heart rate and rhythm were normal [No Edema] : ~T edema was not present [Supple] : ~T the neck demonstrated no ~M decrease in suppleness [Thyroid Normal] : the thyroid ~T showed no abnormalities [Symmetrical] : the neck was ~L symmetrical [Warm and Dry] : was warm and dry to touch [Turgor Normal] : skin turgor ~T was normal [Vulvar Atrophy] : vulvar atrophy [Labia Majora] : were normal [Labia Minora] : were normal [Bartholin's Gland] : both Bartholin's glands were normal  [Normal Appearance] : general appearance was normal [Atrophy] : atrophy [3] : 3 [] : I [Uterine Adnexae] : were not tender and not enlarged [Normal rectal exam] : was normal [Normal] : was normal [None] : no [Mass] : no breast mass [Tender] : no tenderness [Nipple Discharge] : no nipple discharge [Mass (___ Cm)] : no ~M [unfilled] abdominal mass was palpated [Tenderness] : ~T no ~M abdominal tenderness observed [H/Smegaly] : no hepatosplenomegaly [Supraclavicular LAD] : no adenopathy noted in supraclavicular lymph nodes [Axillary LAD] : no adenopathy was noted in axillary nodes [Inguinal LAD] : no adenopathy was noted in the inguinal lymph nodes [Rash/Lesion] : no rash or lesion was noted [Estrogen Effect] : no estrogen effect was observed

## 2020-01-27 NOTE — COUNSELING
[FreeTextEntry1] : #1 MITRAL VALVE REPAIR DOING WELL\par #2 COLO PER GI AND CARDIAC SURGEON (2016)-NORMAL-done nl rpt 2020 or 2021 manish sanchez\par #3 MAMMO normal 2019 with sono and rpt 2020\par #4 PELVIC SONO TA TV 2019 nl rpt 2020 WITH RENAL SONO-normal 2019 rpt 2020 (had 'spot' 1-2 weeks post ecc-this may be normal OR may indicate endometrial path (doubt)-will rec embx if rpts)-may have been from rectum as she is constipated at times-not sure origin-will call and have appt if repeats\par #5 DEXA 2019 no sig change-mild osteopenia-ex and vit d and rpt 2 years\par #6 PCP UP TO DATE (Alessandro Keyes)\par #7 PAP, HPV SENT today 1/27/2020; will re-ref to Dr. Diaz as needed\par #8 UA, UC sent due to past hx microhematuria-neg pvr normal\par #9 LU/UUI STABLE OCC FREQ URGE -PT REFERRAL [facility]IVEN [facility] PT/PTNS 10/7/19 RX APPOINTMENTS MADE on boosters now prn up to 12 rx uuisui<<< #6 right ankle-1/27/2020-pt advised pt at [facility] may help her pop and < oab sx through < udfr\par #10 NO POP SX\par #11 CLOBETASOL USE WEEKLY AT NIGHT/BEDTIME (Mild lichen sclerosis and pm atrophy)-stable qwk use\par #12 ALL QUES ANSWERED\par #13 RETURN 6 mosOr sooner as clinically indicated and for ptns boosters booked\par #14 If the Pap or HPV is aor high-riskand  (carri) will repeat the colposcopy and if she cannot get  good sample on the ECC this will be done using cervical dilation per her note; richi if needs embx\par #15 biannual visit may 2020\par #16 uc ua cytol sent

## 2020-01-27 NOTE — PROCEDURE
[Unable to tolerate drug therapy] : because she is unable to tolerate drug therapy [Failed Kegel Exercises] : due to kegel exercises which have failed [Urge Incontinence] : urge incontinence [Urgency] : urinary urgency [Frequency] : urinary frequency [#12] : treatment #12 [Patient Tolerated Treatment] : patient tolerated treatment well [Appropriate Reflexes Elicited] : appropriate reflexes were elicited [Treatment cycle completed] : treatment cycle was completed [Adverse Reactions] : no adverse reactions, [FreeTextEntry1] : booster 1/27/2020-right ankle at #6 SETTING

## 2020-01-28 LAB
BACTERIA UR CULT: NORMAL
HPV HIGH+LOW RISK DNA PNL CVX: DETECTED
URINE CYTOLOGY: NORMAL

## 2020-02-04 LAB — CYTOLOGY CVX/VAG DOC THIN PREP: ABNORMAL

## 2020-03-02 ENCOUNTER — APPOINTMENT (OUTPATIENT)
Dept: OBGYN | Facility: CLINIC | Age: 80
End: 2020-03-02

## 2020-05-11 ENCOUNTER — APPOINTMENT (OUTPATIENT)
Dept: OBGYN | Facility: CLINIC | Age: 80
End: 2020-05-11
Payer: MEDICARE

## 2020-05-11 VITALS
HEIGHT: 66 IN | DIASTOLIC BLOOD PRESSURE: 86 MMHG | WEIGHT: 158 LBS | BODY MASS INDEX: 25.39 KG/M2 | SYSTOLIC BLOOD PRESSURE: 132 MMHG | HEART RATE: 86 BPM

## 2020-05-11 PROCEDURE — 99214 OFFICE O/P EST MOD 30 MIN: CPT | Mod: 25

## 2020-05-11 PROCEDURE — 51798 US URINE CAPACITY MEASURE: CPT

## 2020-05-11 PROCEDURE — G0101: CPT

## 2020-05-11 NOTE — CHIEF COMPLAINT
[Questionnaire Received] : Patient questionnaire received [Blood In Urine] : blood in urine [Urine Frequency] : urine frequency

## 2020-05-11 NOTE — PHYSICAL EXAM
[Chaperone Present] : A chaperone was present in the examining room during all aspects of the physical examination [No Acute Distress] : in no acute distress [Well developed] : well developed [Well Nourished] : ~L well nourished [Good Hygeine] : demonstrates good hygeine [Normal Memory] : ~T memory was ~L unimpaired [Oriented x3] : oriented to person, place, and time [Normal Mood/Affect] : mood and affect are normal [Normal Lung Sounds] : the lungs were clear to auscultation [Respirations regular] : ~T respiratory rate was regular [Rate & Rhythm Regular] : ~T heart rate and rhythm were normal [No Edema] : ~T edema was not present [Supple] : ~T the neck demonstrated no ~M decrease in suppleness [Thyroid Normal] : the thyroid ~T showed no abnormalities [Symmetrical] : the neck was ~L symmetrical [Warm and Dry] : was warm and dry to touch [Turgor Normal] : skin turgor ~T was normal [Vulvar Atrophy] : vulvar atrophy [Labia Minora] : were normal [Labia Majora] : were normal [Mucosal Prolapse] : had a mucosal prolapse [Bartholin's Gland] : both Bartholin's glands were normal  [Normal Appearance] : general appearance was normal [Atrophy] : atrophy [3] : 3 [] : I [Erosion] : had erosions [Normal rectal exam] : was normal [Uterine Adnexae] : were not tender and not enlarged [Normal] : was normal [None] : no [Tender] : no tenderness [Mass] : no breast mass [Nipple Discharge] : no nipple discharge [Tenderness] : ~T no ~M abdominal tenderness observed [Mass (___ Cm)] : no ~M [unfilled] abdominal mass was palpated [H/Smegaly] : no hepatosplenomegaly [Supraclavicular LAD] : no adenopathy noted in supraclavicular lymph nodes [Axillary LAD] : no adenopathy was noted in axillary nodes [Inguinal LAD] : no adenopathy was noted in the inguinal lymph nodes [Rash/Lesion] : no rash or lesion was noted [Estrogen Effect] : no estrogen effect was observed

## 2020-05-11 NOTE — HISTORY OF PRESENT ILLNESS
[x2] : nocturia two times a night [Sexual Dysfunction, NOS] : no [Stress Incontinence] : mild [Cystocele (Obstetric)] : bladder prolapse [Urinary Frequency] : moderate [Vaginal Wall Prolapse] : vaginal prolapse [Uterine Prolapse] : uterine prolapse [Rectal Prolapse] : rectal prolapse [Constipation Obstructed Defecation] : constipation [Incomplete Emptying Of Stool] : incomplete defecation [Stool Visible Blood] : blood in the stool [Urge Incontinence Of Urine] : urge incontinence [Diarrhea] : diarrhea [Unable To Restrain Bowel Movement] : fecal incontinence [Feelings Of Urinary Urgency] : urinary urgency [Pain During Urination (Dysuria)] : dysuria [Hematuria] : hematuria [Urinary Tract Infection] : urinary tract infection [Urinary Stream Starts And Stops] : intermittent urine stream [Incomplete Emptying Of Bladder] : incomplete empyting of bladder [Urinary Frequency More Than Twice At Night (Nocturia)] : nocturia [Pelvic Pain] : pelvic pain [Vaginal Pain] : vaginal pain [Bladder Pain] : bladder pain [Vulvar Pain] : vulva pain [Rectal Pain] : rectal pain [Back Pain] : flank/back pain [] : no [FreeTextEntry1] : Occasional stress and urge incontinence is doing Kegel exercises on her own and tried formal physical therapy and is now doing this on her own; DOING PTNS FINISHED SERIES NOW OCCASIONAL BOOSTER TREATMENTS\par Lichen sclerosus on clobetasol feeling better\par Vulvar itching is basically resolved \par Estrogen cream twice a week and a quarter to have a gram only D/C'D 2020

## 2020-05-11 NOTE — COUNSELING
[FreeTextEntry1] : #1 MITRAL VALVE REPAIR DOING WELL\par #2 COLO PER GI AND CARDIAC SURGEON (2016)-NORMAL-done nl rpt 2020 or 2021 manish sanchez-CBC RECENTLY < PER PT-REPEATING THIS END OF MAY-MAY BE DUE TO RECENT ANKLE FX-CONSIDER GI-REC TO PT TO FOLLOW UP WITH PCP AND GI MNTZ AND PROCOCHINO\par #3 MAMMO normal 2019 with sono and rpt 2020\par #4 PELVIC SONO TA TV 2019 nl rpt 2020 WITH RENAL SONO-normal 2019 rpt 2020 (had 'spot' 1-2 weeks post ecc-this may be normal OR may indicate endometrial path (doubt)-will rec embx if rpts)-may have been from rectum as she is constipated at times-not sure origin-will call and have appt if repeats-5/11/2020-NO BLEEDING OR SPOTTING AT ALL-FROM VAG/OR RECTUM-NL SONO 2019; RPT SONO TA TV 2020;BX IF NEC.; \par #5 DEXA 2019 no sig change-mild osteopenia-ex and vit d and rpt 2 years\par #6 PCP UP TO DATE (Alessandro Keyes) AND SEES HIM END OF MAY EARLY JUNE\par #7 PAP, HPV SENT today 1/27/2020; will re-ref to Dr. Diaz as needed\par #8 UA, UC sent due to past hx microhematuria-neg JAN 2020-UNABLE TO VOID TODAY-VOIDED JUST PRIOR TO VISIT AND TODAY pvr normal 0 CC'S 5/11/2020-\par #9 LU/UUI STABLE OCC FREQ URGE -PT REFERRAL GIVEN [facility] PT/PTNS 10/7/19 RX APPOINTMENTS MADE on boosters now prn up to 12 rx uuisui<<< #6 right ankle-1/27/2020-pt advised pt at [facility] may help her pop and < oab sx through < udfr-FINISHED PTNS WILL COME IN FOR BOOSTERS PRN BOOKED 8/2020\par #10 NO POP SX\par #11 CLOBETASOL USE WEEKLY AT NIGHT/BEDTIME (Mild lichen sclerosis and pm atrophy)-stable qwk use\par #12 ALL QUES ANSWERED\par #13 RETURN 6 mos BOOKED 11/6 REGULAR VISIT Or sooner as clinically indicated and for ptns boosters booked\par #14 If the Pap or HPV is aor high-riskand  (carri) will repeat the colposcopy and if she cannot get  good sample on the ECC this will be done using cervical dilation per her note; richi if needs embx\par

## 2020-05-12 LAB — HPV HIGH+LOW RISK DNA PNL CVX: NOT DETECTED

## 2020-05-14 LAB — CYTOLOGY CVX/VAG DOC THIN PREP: ABNORMAL

## 2020-08-10 ENCOUNTER — APPOINTMENT (OUTPATIENT)
Dept: OBGYN | Facility: CLINIC | Age: 80
End: 2020-08-10
Payer: MEDICARE

## 2020-08-10 VITALS
TEMPERATURE: 97.6 F | HEIGHT: 66 IN | WEIGHT: 156 LBS | SYSTOLIC BLOOD PRESSURE: 113 MMHG | HEART RATE: 80 BPM | DIASTOLIC BLOOD PRESSURE: 69 MMHG | BODY MASS INDEX: 25.07 KG/M2

## 2020-08-10 DIAGNOSIS — N39.46 MIXED INCONTINENCE: ICD-10-CM

## 2020-08-10 PROCEDURE — 64566 NEUROELTRD STIM POST TIBIAL: CPT

## 2020-08-10 PROCEDURE — 99214 OFFICE O/P EST MOD 30 MIN: CPT | Mod: 25

## 2020-08-10 RX ORDER — PANTOPRAZOLE 40 MG/1
40 TABLET, DELAYED RELEASE ORAL
Qty: 30 | Refills: 0 | Status: ACTIVE | COMMUNITY
Start: 2020-05-29

## 2020-08-28 ENCOUNTER — APPOINTMENT (OUTPATIENT)
Dept: ULTRASOUND IMAGING | Facility: IMAGING CENTER | Age: 80
End: 2020-08-28
Payer: MEDICARE

## 2020-08-28 ENCOUNTER — RESULT REVIEW (OUTPATIENT)
Age: 80
End: 2020-08-28

## 2020-08-28 ENCOUNTER — OUTPATIENT (OUTPATIENT)
Dept: OUTPATIENT SERVICES | Facility: HOSPITAL | Age: 80
LOS: 1 days | End: 2020-08-28
Payer: MEDICARE

## 2020-08-28 ENCOUNTER — APPOINTMENT (OUTPATIENT)
Dept: RADIOLOGY | Facility: IMAGING CENTER | Age: 80
End: 2020-08-28
Payer: MEDICARE

## 2020-08-28 ENCOUNTER — APPOINTMENT (OUTPATIENT)
Dept: MAMMOGRAPHY | Facility: IMAGING CENTER | Age: 80
End: 2020-08-28
Payer: MEDICARE

## 2020-08-28 DIAGNOSIS — N81.9 FEMALE GENITAL PROLAPSE, UNSPECIFIED: ICD-10-CM

## 2020-08-28 DIAGNOSIS — N84.1 POLYP OF CERVIX UTERI: ICD-10-CM

## 2020-08-28 DIAGNOSIS — R10.2 PELVIC AND PERINEAL PAIN: ICD-10-CM

## 2020-08-28 PROCEDURE — 76830 TRANSVAGINAL US NON-OB: CPT

## 2020-08-28 PROCEDURE — 76830 TRANSVAGINAL US NON-OB: CPT | Mod: 26

## 2020-08-28 PROCEDURE — 76856 US EXAM PELVIC COMPLETE: CPT

## 2020-08-28 PROCEDURE — 76856 US EXAM PELVIC COMPLETE: CPT | Mod: 26

## 2020-10-08 ENCOUNTER — RESULT REVIEW (OUTPATIENT)
Age: 80
End: 2020-10-08

## 2020-10-08 ENCOUNTER — APPOINTMENT (OUTPATIENT)
Dept: ULTRASOUND IMAGING | Facility: IMAGING CENTER | Age: 80
End: 2020-10-08
Payer: MEDICARE

## 2020-10-08 ENCOUNTER — OUTPATIENT (OUTPATIENT)
Dept: OUTPATIENT SERVICES | Facility: HOSPITAL | Age: 80
LOS: 1 days | End: 2020-10-08
Payer: MEDICARE

## 2020-10-08 ENCOUNTER — APPOINTMENT (OUTPATIENT)
Dept: MAMMOGRAPHY | Facility: IMAGING CENTER | Age: 80
End: 2020-10-08
Payer: MEDICARE

## 2020-10-08 DIAGNOSIS — R92.2 INCONCLUSIVE MAMMOGRAM: ICD-10-CM

## 2020-10-08 DIAGNOSIS — Z00.00 ENCOUNTER FOR GENERAL ADULT MEDICAL EXAMINATION WITHOUT ABNORMAL FINDINGS: ICD-10-CM

## 2020-10-08 PROCEDURE — 77067 SCR MAMMO BI INCL CAD: CPT | Mod: 26

## 2020-10-08 PROCEDURE — 77063 BREAST TOMOSYNTHESIS BI: CPT

## 2020-10-08 PROCEDURE — 76641 ULTRASOUND BREAST COMPLETE: CPT

## 2020-10-08 PROCEDURE — 76641 ULTRASOUND BREAST COMPLETE: CPT | Mod: 26,50

## 2020-10-08 PROCEDURE — 77063 BREAST TOMOSYNTHESIS BI: CPT | Mod: 26

## 2020-10-08 PROCEDURE — 77067 SCR MAMMO BI INCL CAD: CPT

## 2020-11-16 ENCOUNTER — APPOINTMENT (OUTPATIENT)
Dept: OBGYN | Facility: CLINIC | Age: 80
End: 2020-11-16
Payer: MEDICARE

## 2020-11-16 VITALS — HEIGHT: 66 IN | SYSTOLIC BLOOD PRESSURE: 138 MMHG | DIASTOLIC BLOOD PRESSURE: 69 MMHG | HEART RATE: 79 BPM

## 2020-11-16 DIAGNOSIS — R31.9 HEMATURIA, UNSPECIFIED: ICD-10-CM

## 2020-11-16 PROCEDURE — 99214 OFFICE O/P EST MOD 30 MIN: CPT

## 2020-11-16 NOTE — COUNSELING
[FreeTextEntry1] : #1 MITRAL VALVE REPAIR DOING WELL\par #2 COLO PER GI AND CARDIAC SURGEON (2016)-NORMAL-done nl rpt 2020 or 2021 manish sanchez-CBC RECENTLY < PER PT-REPEATING THIS END OF MAY-MAY BE DUE TO RECENT ANKLE FX-CONSIDER GI-REC TO PT TO FOLLOW UP WITH PCP AND GI MNCYNTHIA AND PROCOCHINO\par #3 MAMMO normal w/sono 2020 rpt 2021\par #4 PELVIC SONO TA TV 2019 nl rpt 2020 WITH RENAL SONO-normal 2019 rpt 2020 (had 'spot' 1-2 weeks post ecc-this may be normal OR may indicate endometrial path (doubt)-will rec embx if rpts)-may have been from rectum as she is constipated at times-not sure origin-will call and have appt if repeats-5/11/2020-NO BLEEDING OR SPOTTING AT ALL-FROM VAG/OR RECTUM-NL SONO 2019; RPT SONO TA TV 2020;BX IF NEC.; normal 2020 rpt 2021; no further bleeding/or spotting whatsoever\par #5 DEXA 2019 no sig change-mild osteopenia-ex and vit d and rpt 2 years\par #6 PCP UP TO DATE (Alessandro Keyes) AND just saw him recently-q3mos\par #7 PAP, HPV SENT today 11/16/2020; will re-ref to Dr. Diaz as needed\par #8 UA, UC sent due to past hx microhematura with cytology, nl pvr\par #9 MADHAVI/UUI STABLE OCC FREQ URGE -PT REFERRAL GIVEN [facility] PT/PTNS 10/7/19 RX APPOINTMENTS MADE on boosters now prn up to 12 rx uuisui<<< #6 right ankle-1/27/2020-pt advised pt at [facility] may help her pop and < oab sx through < udfr-FINISHED PTNS WILL COME IN FOR BOOSTERS PRN BOOKED 8/2020-11/16/2020-doing well, voids a lot and urinates a lot but min leakage now and occ madhavi (pop), will have boosters ptns prn now\par #10 minimal POP SX if at all\par #11 CLOBETASOL USE WEEKLY AT NIGHT/BEDTIME (Mild lichen sclerosis and pm atrophy)-stable qwk use refilled\par #12 ALL QUES ANSWERED\par #13 RETURN 6 mos BOOKED 11/6 REGULAR VISIT Or sooner as clinically indicated and for ptns boosters prn\par #14 If the Pap or HPV is aor high-riskand  (carri) will repeat the colposcopy and if she cannot get  good sample on the ECC this will be done using cervical dilation per her note; richi if needs embx\par #15 all vaccines up to date\par JMR\par

## 2020-11-16 NOTE — PHYSICAL EXAM
[Chaperone Present] : A chaperone was present in the examining room during all aspects of the physical examination [No Acute Distress] : in no acute distress [Well developed] : well developed [Well Nourished] : ~L well nourished [Good Hygeine] : demonstrates good hygeine [Oriented x3] : oriented to person, place, and time [Normal Memory] : ~T memory was ~L unimpaired [Normal Mood/Affect] : mood and affect are normal [Normal Lung Sounds] : the lungs were clear to auscultation [Respirations regular] : ~T respiratory rate was regular [Rate & Rhythm Regular] : ~T heart rate and rhythm were normal [No Edema] : ~T edema was not present [Supple] : ~T the neck demonstrated no ~M decrease in suppleness [Thyroid Normal] : the thyroid ~T showed no abnormalities [Symmetrical] : the neck was ~L symmetrical [Warm and Dry] : was warm and dry to touch [Turgor Normal] : skin turgor ~T was normal [Vulvar Atrophy] : vulvar atrophy [Labia Majora] : were normal [Labia Minora] : were normal [Bartholin's Gland] : both Bartholin's glands were normal  [Mucosal Prolapse] : had a mucosal prolapse [Normal Appearance] : general appearance was normal [Atrophy] : atrophy [3] : 3 [] : I [Erosion] : had erosions [Uterine Adnexae] : were not tender and not enlarged [Normal rectal exam] : was normal [Normal] : was normal [None] : no [Mass] : no breast mass [Tender] : no tenderness [Nipple Discharge] : no nipple discharge [Mass (___ Cm)] : no ~M [unfilled] abdominal mass was palpated [Tenderness] : ~T no ~M abdominal tenderness observed [H/Smegaly] : no hepatosplenomegaly [Supraclavicular LAD] : no adenopathy noted in supraclavicular lymph nodes [Axillary LAD] : no adenopathy was noted in axillary nodes [Inguinal LAD] : no adenopathy was noted in the inguinal lymph nodes [Rash/Lesion] : no rash or lesion was noted [Estrogen Effect] : no estrogen effect was observed

## 2020-11-16 NOTE — HISTORY OF PRESENT ILLNESS
[x2] : nocturia two times a night [Sexual Dysfunction, NOS] : no [Stress Incontinence] : mild [Urinary Frequency] : moderate [Cystocele (Obstetric)] : bladder prolapse [Uterine Prolapse] : uterine prolapse [Vaginal Wall Prolapse] : vaginal prolapse [Rectal Prolapse] : rectal prolapse [Constipation Obstructed Defecation] : constipation [Incomplete Emptying Of Stool] : incomplete defecation [Stool Visible Blood] : blood in the stool [Diarrhea] : diarrhea [Urge Incontinence Of Urine] : urge incontinence [Unable To Restrain Bowel Movement] : fecal incontinence [Feelings Of Urinary Urgency] : urinary urgency [Pain During Urination (Dysuria)] : dysuria [Urinary Tract Infection] : urinary tract infection [Hematuria] : hematuria [Incomplete Emptying Of Bladder] : incomplete empyting of bladder [Urinary Stream Starts And Stops] : intermittent urine stream [Urinary Frequency More Than Twice At Night (Nocturia)] : nocturia [Pelvic Pain] : pelvic pain [Vaginal Pain] : vaginal pain [Vulvar Pain] : vulva pain [Bladder Pain] : bladder pain [Rectal Pain] : rectal pain [Back Pain] : flank/back pain [] : no [FreeTextEntry1] : Occasional stress and urge incontinence is doing Kegel exercises on her own and tried formal physical therapy and is now doing this on her own; DOING PTNS FINISHED SERIES NOW OCCASIONAL BOOSTER TREATMENTS\par Lichen sclerosus on clobetasol feeling better\par Vulvar itching is basically resolved \par Estrogen cream twice a week and a quarter to have a gram only D/C'D 2020

## 2020-11-17 LAB
APPEARANCE: CLEAR
BACTERIA: NEGATIVE
BILIRUBIN URINE: NEGATIVE
BLOOD URINE: NEGATIVE
COLOR: YELLOW
GLUCOSE QUALITATIVE U: NEGATIVE
HYALINE CASTS: 0 /LPF
KETONES URINE: NEGATIVE
LEUKOCYTE ESTERASE URINE: NEGATIVE
MICROSCOPIC-UA: NORMAL
NITRITE URINE: NEGATIVE
PH URINE: 5.5
PROTEIN URINE: NEGATIVE
RED BLOOD CELLS URINE: 3 /HPF
SPECIFIC GRAVITY URINE: 1.02
SQUAMOUS EPITHELIAL CELLS: 0 /HPF
URINE CYTOLOGY: NORMAL
UROBILINOGEN URINE: NORMAL
WHITE BLOOD CELLS URINE: 1 /HPF

## 2020-11-18 LAB
BACTERIA UR CULT: NORMAL
HPV HIGH+LOW RISK DNA PNL CVX: DETECTED

## 2020-11-20 LAB — CYTOLOGY CVX/VAG DOC THIN PREP: ABNORMAL

## 2021-05-28 ENCOUNTER — APPOINTMENT (OUTPATIENT)
Dept: OBGYN | Facility: CLINIC | Age: 81
End: 2021-05-28
Payer: MEDICARE

## 2021-05-28 VITALS
HEIGHT: 66 IN | WEIGHT: 174 LBS | SYSTOLIC BLOOD PRESSURE: 134 MMHG | HEART RATE: 64 BPM | TEMPERATURE: 97.1 F | DIASTOLIC BLOOD PRESSURE: 82 MMHG | BODY MASS INDEX: 27.97 KG/M2

## 2021-05-28 PROCEDURE — G0101: CPT

## 2021-05-28 PROCEDURE — 99213 OFFICE O/P EST LOW 20 MIN: CPT | Mod: 25

## 2021-05-28 NOTE — COUNSELING
[FreeTextEntry1] : #1 MITRAL VALVE REPAIR DOING WELL\par #2 COLO PER GI AND CARDIAC SURGEON (2016)-NORMAL-done nl rpt 2020 or 2021 manish sanchez-CBC RECENTLY < PER PT-REPEATING THIS END OF MAY-MAY BE DUE TO RECENT ANKLE FX-CONSIDER GI-REC TO PT TO FOLLOW UP WITH PCP AND GI MNTZ AND PROCOCHINO colo 9/21 booked\par #3 MAMMO normal w/sono 2020 rpt 2021\par #4 PELVIC SONO TA TV 2019 nl rpt 2020 WITH RENAL SONO-normal 2019 rpt 2020 (had 'spot' 1-2 weeks post ecc-this may be normal OR may indicate endometrial path (doubt)-will rec embx if rpts)-may have been from rectum as she is constipated at times-not sure origin-will call and have appt if repeats-5/11/2020-NO BLEEDING OR SPOTTING AT ALL-FROM VAG/OR RECTUM-NL SONO 2019; RPT SONO TA TV 2020;BX IF NEC.; normal 2020 rpt 2021; no further bleeding/or spotting whatsoever\par #5 DEXA 2019 no sig change-mild osteopenia-ex and vit d and rpt 2 years 2021\par #6 PCP UP TO DATE (Alessandro Keyes) -q3mos\par #7 PAP, HPV SENT today 5/28/21; will re-ref to Dr. Diaz as needed\par #8 UA, UC sent due to past hx microhematura with cytology, nl pvr\par #9 MADHAVI/UUI STABLE OCC FREQ URGE -PT REFERRAL GIVEN [facility] PT/PTNS 10/7/19 RX APPOINTMENTS MADE on boosters now prn up to 12 rx uuisui<<< #6 right ankle-1/27/2020-pt advised pt at [facility] may help her pop and < oab sx through < udfr-FINISHED doing well, voids a lot and urinates a lot but min leakage now and occ madhavi (pop), will have boosters ptns prn now\par #10 minimal POP SX if at all\par #11 CLOBETASOL USE WEEKLY AT NIGHT/BEDTIME (Mild lichen sclerosis and pm atrophy)-stable qwk use refilled\par #12 ALL QUES ANSWERED\par #13 RETURN 6 mos BOOKED 11/6 REGULAR VISIT Or sooner as clinically indicated and for ptns boosters prn\par #14 If the Pap or HPV is aor high-riskand  (carri) will repeat the colposcopy and if she cannot get  good sample on the ECC this will be done using cervical dilation per her note; richi if needs embx\par #15 all vaccines up to date including covid19 vaccine x 2, shingles x 2 vaccine, pneumovax, flu shot yr\par JMR\par

## 2021-05-29 LAB
APPEARANCE: CLEAR
BACTERIA: NEGATIVE
BILIRUBIN URINE: NEGATIVE
BLOOD URINE: NEGATIVE
COLOR: NORMAL
GLUCOSE QUALITATIVE U: NEGATIVE
HPV HIGH+LOW RISK DNA PNL CVX: DETECTED
HYALINE CASTS: 0 /LPF
KETONES URINE: NEGATIVE
LEUKOCYTE ESTERASE URINE: NEGATIVE
MICROSCOPIC-UA: NORMAL
NITRITE URINE: NEGATIVE
PH URINE: 6.5
PROTEIN URINE: NEGATIVE
RED BLOOD CELLS URINE: 1 /HPF
SPECIFIC GRAVITY URINE: 1.02
SQUAMOUS EPITHELIAL CELLS: 0 /HPF
UROBILINOGEN URINE: NORMAL
WHITE BLOOD CELLS URINE: 0 /HPF

## 2021-05-30 LAB — BACTERIA UR CULT: NORMAL

## 2021-06-01 ENCOUNTER — RESULT REVIEW (OUTPATIENT)
Age: 81
End: 2021-06-01

## 2021-06-02 LAB — URINE CYTOLOGY: NORMAL

## 2021-06-03 LAB — CYTOLOGY CVX/VAG DOC THIN PREP: ABNORMAL

## 2021-06-27 NOTE — REVIEW OF SYSTEMS
called me and let me know patient is going to [place] tomorrow. She let pharmacy know and they are able to fill today.  
Amada from [facility] pharmacy calling to state the patient is in the pharmacy now and would like a refill of her pain medication asap, early because she will be leaving to go out of town tomorrow  
See TE 1/8 refill encounter  Medication sent on 1/9 at 12:53 by Dr. Keane.    Call placed to pharmacy.    Medication sent but pharmacy is stating that she is not due for it until tomorrow so insurance will not authorize it.  Pharmacy is able to dispense it up to 2 days early if there is a dose change if going to fill it early    MD will willing to change dosing instructions.    Otherwise patient insurance will not cover until tomorrow.    MD to advise.  
[Negative] : Heme/Lymph

## 2021-07-01 ENCOUNTER — APPOINTMENT (OUTPATIENT)
Dept: OBGYN | Facility: CLINIC | Age: 81
End: 2021-07-01
Payer: MEDICARE

## 2021-07-01 VITALS
BODY MASS INDEX: 27.16 KG/M2 | HEIGHT: 66 IN | SYSTOLIC BLOOD PRESSURE: 132 MMHG | HEART RATE: 63 BPM | DIASTOLIC BLOOD PRESSURE: 83 MMHG | WEIGHT: 169 LBS

## 2021-07-01 PROCEDURE — 57456 ENDOCERV CURETTAGE W/SCOPE: CPT

## 2021-07-01 NOTE — PHYSICAL EXAM
[Appropriately responsive] : appropriately responsive [Alert] : alert [No Acute Distress] : no acute distress [Labia Majora] : normal [Labia Minora] : normal [Atrophy] : atrophy [Normal] : normal [Uterine Adnexae] : normal [FreeTextEntry5] : cx os stenotic

## 2021-07-01 NOTE — PROCEDURE
[Colposcopy] : Colposcopy  [Time out performed] : Pre-procedure time out performed.  Patient's name, date of birth and procedure confirmed. [Consent Obtained] : Consent obtained [Risks] : risks [Benefits] : benefits [Alternatives] : alternatives [Patient] : patient [Infection] : infection [Bleeding] : bleeding [Allergic Reaction] : allergic reaction [ASCUS] : ASCUS [HPV High Risk] : HPV high risk [Pap Performed] : pap not performed [ECC Performed] : ECC performed [No Abnormalities] : no abnormalities [Hemostasis Obtained] : Hemostasis obtained [Biopsy] : biopsy not taken [Tolerated Well] : the patient tolerated the procedure well [de-identified] : TZNFV.Scalpel used to open the os

## 2021-07-13 ENCOUNTER — NON-APPOINTMENT (OUTPATIENT)
Age: 81
End: 2021-07-13

## 2021-07-13 LAB — CORE LAB BIOPSY: NORMAL

## 2021-10-13 ENCOUNTER — NON-APPOINTMENT (OUTPATIENT)
Age: 81
End: 2021-10-13

## 2021-10-13 ENCOUNTER — RESULT REVIEW (OUTPATIENT)
Age: 81
End: 2021-10-13

## 2021-10-13 ENCOUNTER — APPOINTMENT (OUTPATIENT)
Dept: ULTRASOUND IMAGING | Facility: IMAGING CENTER | Age: 81
End: 2021-10-13
Payer: MEDICARE

## 2021-10-13 ENCOUNTER — APPOINTMENT (OUTPATIENT)
Dept: MAMMOGRAPHY | Facility: IMAGING CENTER | Age: 81
End: 2021-10-13
Payer: MEDICARE

## 2021-10-13 ENCOUNTER — OUTPATIENT (OUTPATIENT)
Dept: OUTPATIENT SERVICES | Facility: HOSPITAL | Age: 81
LOS: 1 days | End: 2021-10-13
Payer: MEDICARE

## 2021-10-13 ENCOUNTER — APPOINTMENT (OUTPATIENT)
Dept: RADIOLOGY | Facility: IMAGING CENTER | Age: 81
End: 2021-10-13
Payer: MEDICARE

## 2021-10-13 DIAGNOSIS — Z00.8 ENCOUNTER FOR OTHER GENERAL EXAMINATION: ICD-10-CM

## 2021-10-13 PROCEDURE — 77080 DXA BONE DENSITY AXIAL: CPT | Mod: 26

## 2021-10-13 PROCEDURE — 76830 TRANSVAGINAL US NON-OB: CPT | Mod: 26

## 2021-10-13 PROCEDURE — 77067 SCR MAMMO BI INCL CAD: CPT | Mod: 26

## 2021-10-13 PROCEDURE — 76856 US EXAM PELVIC COMPLETE: CPT | Mod: 26

## 2021-10-13 PROCEDURE — 76775 US EXAM ABDO BACK WALL LIM: CPT

## 2021-10-13 PROCEDURE — 76775 US EXAM ABDO BACK WALL LIM: CPT | Mod: 26

## 2021-10-13 PROCEDURE — 77063 BREAST TOMOSYNTHESIS BI: CPT | Mod: 26

## 2021-10-13 PROCEDURE — 76641 ULTRASOUND BREAST COMPLETE: CPT | Mod: 26,50

## 2021-10-13 PROCEDURE — 76641 ULTRASOUND BREAST COMPLETE: CPT

## 2021-10-13 PROCEDURE — 76830 TRANSVAGINAL US NON-OB: CPT

## 2021-10-13 PROCEDURE — 77067 SCR MAMMO BI INCL CAD: CPT

## 2021-10-13 PROCEDURE — 77080 DXA BONE DENSITY AXIAL: CPT

## 2021-10-13 PROCEDURE — 76856 US EXAM PELVIC COMPLETE: CPT

## 2021-10-13 PROCEDURE — 77063 BREAST TOMOSYNTHESIS BI: CPT

## 2021-10-14 ENCOUNTER — RESULT REVIEW (OUTPATIENT)
Age: 81
End: 2021-10-14

## 2021-10-14 DIAGNOSIS — N64.9 DISORDER OF BREAST, UNSPECIFIED: ICD-10-CM

## 2021-11-02 ENCOUNTER — NON-APPOINTMENT (OUTPATIENT)
Age: 81
End: 2021-11-02

## 2021-11-19 ENCOUNTER — APPOINTMENT (OUTPATIENT)
Dept: OBGYN | Facility: CLINIC | Age: 81
End: 2021-11-19
Payer: MEDICARE

## 2021-11-19 ENCOUNTER — RESULT REVIEW (OUTPATIENT)
Age: 81
End: 2021-11-19

## 2021-11-19 ENCOUNTER — OUTPATIENT (OUTPATIENT)
Dept: OUTPATIENT SERVICES | Facility: HOSPITAL | Age: 81
LOS: 1 days | End: 2021-11-19
Payer: MEDICARE

## 2021-11-19 ENCOUNTER — APPOINTMENT (OUTPATIENT)
Dept: ULTRASOUND IMAGING | Facility: IMAGING CENTER | Age: 81
End: 2021-11-19
Payer: MEDICARE

## 2021-11-19 VITALS — HEART RATE: 88 BPM | SYSTOLIC BLOOD PRESSURE: 112 MMHG | HEIGHT: 66 IN | DIASTOLIC BLOOD PRESSURE: 74 MMHG

## 2021-11-19 DIAGNOSIS — N64.9 DISORDER OF BREAST, UNSPECIFIED: ICD-10-CM

## 2021-11-19 DIAGNOSIS — N60.01 SOLITARY CYST OF RIGHT BREAST: ICD-10-CM

## 2021-11-19 PROCEDURE — 76642 ULTRASOUND BREAST LIMITED: CPT | Mod: 26,RT

## 2021-11-19 PROCEDURE — 99214 OFFICE O/P EST MOD 30 MIN: CPT

## 2021-11-19 PROCEDURE — 76642 ULTRASOUND BREAST LIMITED: CPT

## 2021-11-19 NOTE — PHYSICAL EXAM
[Chaperone Present] : A chaperone was present in the examining room during all aspects of the physical examination [No Acute Distress] : in no acute distress [Well developed] : well developed [Well Nourished] : ~L well nourished [Good Hygeine] : demonstrates good hygeine [Oriented x3] : oriented to person, place, and time [Normal Memory] : ~T memory was ~L unimpaired [Normal Mood/Affect] : mood and affect are normal [Normal Lung Sounds] : the lungs were clear to auscultation [Respirations regular] : ~T respiratory rate was regular [Rate & Rhythm Regular] : ~T heart rate and rhythm were normal [No Edema] : ~T edema was not present [Supple] : ~T the neck demonstrated no ~M decrease in suppleness [Thyroid Normal] : the thyroid ~T showed no abnormalities [Symmetrical] : the neck was ~L symmetrical [Warm and Dry] : was warm and dry to touch [Turgor Normal] : skin turgor ~T was normal [Vulvar Atrophy] : vulvar atrophy [Labia Majora] : were normal [Labia Minora] : were normal [Bartholin's Gland] : both Bartholin's glands were normal  [Mucosal Prolapse] : had a mucosal prolapse [Normal Appearance] : general appearance was normal [Atrophy] : atrophy [3] : 3 [] : I [Erosion] : had erosions [Uterine Adnexae] : were not tender and not enlarged [Normal rectal exam] : was normal [Normal] : was normal [None] : no [FreeTextEntry1] : deanne [Mass] : no breast mass [Tender] : no tenderness [Nipple Discharge] : no nipple discharge [Mass (___ Cm)] : no ~M [unfilled] abdominal mass was palpated [Tenderness] : ~T no ~M abdominal tenderness observed [H/Smegaly] : no hepatosplenomegaly [Supraclavicular LAD] : no adenopathy noted in supraclavicular lymph nodes [Axillary LAD] : no adenopathy was noted in axillary nodes [Inguinal LAD] : no adenopathy was noted in the inguinal lymph nodes [Rash/Lesion] : no rash or lesion was noted [Estrogen Effect] : no estrogen effect was observed
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
## 2021-11-20 LAB
APPEARANCE: CLEAR
BACTERIA: NEGATIVE
BILIRUBIN URINE: NEGATIVE
BLOOD URINE: NORMAL
CALCIUM OXALATE CRYSTALS: ABNORMAL
COLOR: YELLOW
GLUCOSE QUALITATIVE U: NEGATIVE
HYALINE CASTS: 1 /LPF
KETONES URINE: NORMAL
LEUKOCYTE ESTERASE URINE: NEGATIVE
MICROSCOPIC-UA: NORMAL
NITRITE URINE: NEGATIVE
PH URINE: 5.5
PROTEIN URINE: NORMAL
RED BLOOD CELLS URINE: 4 /HPF
SPECIFIC GRAVITY URINE: 1.03
SQUAMOUS EPITHELIAL CELLS: 1 /HPF
UROBILINOGEN URINE: NORMAL
WHITE BLOOD CELLS URINE: 1 /HPF

## 2021-11-21 LAB — BACTERIA UR CULT: NORMAL

## 2021-11-23 LAB — URINE CYTOLOGY: NORMAL

## 2021-12-30 ENCOUNTER — OUTPATIENT (OUTPATIENT)
Dept: OUTPATIENT SERVICES | Facility: HOSPITAL | Age: 81
LOS: 1 days | End: 2021-12-30
Payer: MEDICARE

## 2021-12-30 VITALS
WEIGHT: 169.98 LBS | RESPIRATION RATE: 16 BRPM | TEMPERATURE: 98 F | SYSTOLIC BLOOD PRESSURE: 137 MMHG | OXYGEN SATURATION: 96 % | HEART RATE: 74 BPM | DIASTOLIC BLOOD PRESSURE: 80 MMHG | HEIGHT: 66 IN

## 2021-12-30 DIAGNOSIS — Z91.040 LATEX ALLERGY STATUS: ICD-10-CM

## 2021-12-30 DIAGNOSIS — M20.11 HALLUX VALGUS (ACQUIRED), RIGHT FOOT: Chronic | ICD-10-CM

## 2021-12-30 DIAGNOSIS — M20.41 OTHER HAMMER TOE(S) (ACQUIRED), RIGHT FOOT: ICD-10-CM

## 2021-12-30 DIAGNOSIS — M79.671 PAIN IN RIGHT FOOT: ICD-10-CM

## 2021-12-30 DIAGNOSIS — Z98.890 OTHER SPECIFIED POSTPROCEDURAL STATES: Chronic | ICD-10-CM

## 2021-12-30 DIAGNOSIS — Z88.0 ALLERGY STATUS TO PENICILLIN: ICD-10-CM

## 2021-12-30 DIAGNOSIS — M20.11 HALLUX VALGUS (ACQUIRED), RIGHT FOOT: ICD-10-CM

## 2021-12-30 DIAGNOSIS — M77.51 OTHER ENTHESOPATHY OF RIGHT FOOT AND ANKLE: ICD-10-CM

## 2021-12-30 DIAGNOSIS — M20.12 HALLUX VALGUS (ACQUIRED), LEFT FOOT: Chronic | ICD-10-CM

## 2021-12-30 DIAGNOSIS — R52 PAIN, UNSPECIFIED: Chronic | ICD-10-CM

## 2021-12-30 LAB
ANION GAP SERPL CALC-SCNC: 8 MMOL/L — SIGNIFICANT CHANGE UP (ref 7–14)
BUN SERPL-MCNC: 19 MG/DL — SIGNIFICANT CHANGE UP (ref 7–23)
CALCIUM SERPL-MCNC: 9.3 MG/DL — SIGNIFICANT CHANGE UP (ref 8.4–10.5)
CHLORIDE SERPL-SCNC: 108 MMOL/L — HIGH (ref 98–107)
CO2 SERPL-SCNC: 24 MMOL/L — SIGNIFICANT CHANGE UP (ref 22–31)
CREAT SERPL-MCNC: 0.89 MG/DL — SIGNIFICANT CHANGE UP (ref 0.5–1.3)
GLUCOSE SERPL-MCNC: 82 MG/DL — SIGNIFICANT CHANGE UP (ref 70–99)
HCT VFR BLD CALC: 39.2 % — SIGNIFICANT CHANGE UP (ref 34.5–45)
HGB BLD-MCNC: 12.4 G/DL — SIGNIFICANT CHANGE UP (ref 11.5–15.5)
MCHC RBC-ENTMCNC: 28.4 PG — SIGNIFICANT CHANGE UP (ref 27–34)
MCHC RBC-ENTMCNC: 31.6 GM/DL — LOW (ref 32–36)
MCV RBC AUTO: 89.9 FL — SIGNIFICANT CHANGE UP (ref 80–100)
NRBC # BLD: 0 /100 WBCS — SIGNIFICANT CHANGE UP
NRBC # FLD: 0 K/UL — SIGNIFICANT CHANGE UP
PLATELET # BLD AUTO: 141 K/UL — LOW (ref 150–400)
POTASSIUM SERPL-MCNC: 4.6 MMOL/L — SIGNIFICANT CHANGE UP (ref 3.5–5.3)
POTASSIUM SERPL-SCNC: 4.6 MMOL/L — SIGNIFICANT CHANGE UP (ref 3.5–5.3)
RBC # BLD: 4.36 M/UL — SIGNIFICANT CHANGE UP (ref 3.8–5.2)
RBC # FLD: 13.6 % — SIGNIFICANT CHANGE UP (ref 10.3–14.5)
SODIUM SERPL-SCNC: 140 MMOL/L — SIGNIFICANT CHANGE UP (ref 135–145)
WBC # BLD: 6.77 K/UL — SIGNIFICANT CHANGE UP (ref 3.8–10.5)
WBC # FLD AUTO: 6.77 K/UL — SIGNIFICANT CHANGE UP (ref 3.8–10.5)

## 2021-12-30 PROCEDURE — 93010 ELECTROCARDIOGRAM REPORT: CPT

## 2021-12-30 NOTE — H&P PST ADULT - REASON FOR ADMISSION
they are operating on my bunion and two toes (2nd and 5th toes they are operating on my bunion and two toes (2nd and 5th toes)

## 2021-12-30 NOTE — H&P PST ADULT - NSANTHOSAYNRD_GEN_A_CORE
No. PHIL screening performed.  STOP BANG Legend: 0-2 = LOW Risk; 3-4 = INTERMEDIATE Risk; 5-8 = HIGH Risk

## 2021-12-30 NOTE — H&P PST ADULT - NSICDXPASTSURGICALHX_GEN_ALL_CORE_FT
PAST SURGICAL HISTORY:  H/O mitral valve repair tissue valve in 2016    Hallux valgus, left and hammertoes in 2016    Pain right ankle fracture with hardware -- hardware has subsequently been remove

## 2021-12-30 NOTE — H&P PST ADULT - NSICDXPASTMEDICALHX_GEN_ALL_CORE_FT
PAST MEDICAL HISTORY:  Hallux valgus with bunions, left     Hallux valgus, right with hammertoes in Jan. 2022    Hammer toe left 2nd toe    Hypothyroidism      PAST MEDICAL HISTORY:  Cough POSITIVE covid April 2020    Hallux valgus with bunions, left     Hallux valgus, right with hammertoes in Jan. 2022    Hammer toe left 2nd toe    Hypothyroidism

## 2021-12-30 NOTE — H&P PST ADULT - PROBLEM SELECTOR PLAN 1
This is a 80 y/o female who is scheduled for right foot Robbi bunionectomy 2nd digit fusion with Weil osteotomy on 1-12-22  * Instructed to speak to surgeon regarding covid testing  * Given preop and cleanser instructions with good teach back and patient verbalized understanding  * Instructed by pcp to stop aspirin 10 days before surgery  * Instructed to take normal am dose of synthroid the am of surgery This is a 82 y/o female who is scheduled for right foot Robbi bunionectomy 2nd digit fusion with Weil osteotomy on 1-12-22  * Instructed to speak to surgeon regarding covid testing  * Given preop and cleanser instructions with good teach back and patient verbalized understanding  * Await echo done through pcp, Dr. Keyes  * Instructed by pcp to stop aspirin 10 days before surgery  * Instructed to take normal am dose of synthroid the am of surgery

## 2021-12-30 NOTE — H&P PST ADULT - HISTORY OF PRESENT ILLNESS
This is a 80 y/o female who presents with right foot bunion and hammertoes confirmed on xrays. Further intervention recommended. Scheduled for right foot Robbi bunionectomy 2nd digit fusion with Weil osteotomy

## 2022-01-05 ENCOUNTER — APPOINTMENT (OUTPATIENT)
Dept: OBGYN | Facility: CLINIC | Age: 82
End: 2022-01-05
Payer: MEDICARE

## 2022-01-05 VITALS
DIASTOLIC BLOOD PRESSURE: 84 MMHG | HEIGHT: 66 IN | WEIGHT: 171 LBS | HEART RATE: 73 BPM | SYSTOLIC BLOOD PRESSURE: 119 MMHG | BODY MASS INDEX: 27.48 KG/M2

## 2022-01-05 DIAGNOSIS — R87.619 UNSPECIFIED ABNORMAL CYTOLOGICAL FINDINGS IN SPECIMENS FROM CERVIX UTERI: ICD-10-CM

## 2022-01-05 PROCEDURE — 57456 ENDOCERV CURETTAGE W/SCOPE: CPT

## 2022-01-05 PROCEDURE — 99213 OFFICE O/P EST LOW 20 MIN: CPT | Mod: 25

## 2022-01-05 NOTE — PROCEDURE
[Colposcopy] : Colposcopy  [Time out performed] : Pre-procedure time out performed.  Patient's name, date of birth and procedure confirmed. [Consent Obtained] : Consent obtained [Risks] : risks [Benefits] : benefits [Alternatives] : alternatives [Patient] : patient [Infection] : infection [Bleeding] : bleeding [Allergic Reaction] : allergic reaction [ASCUS] : ASCUS [HPV High Risk] : HPV high risk [ECC Performed] : ECC performed [No Abnormalities] : no abnormalities [Hemostasis Obtained] : Hemostasis obtained [Tolerated Well] : the patient tolerated the procedure well [Pap Performed] : pap not performed [Biopsy] : biopsy not taken [de-identified] : TZNFV.Scalpel used to open the os

## 2022-01-11 ENCOUNTER — TRANSCRIPTION ENCOUNTER (OUTPATIENT)
Age: 82
End: 2022-01-11

## 2022-01-12 ENCOUNTER — RESULT REVIEW (OUTPATIENT)
Age: 82
End: 2022-01-12

## 2022-01-12 ENCOUNTER — OUTPATIENT (OUTPATIENT)
Dept: OUTPATIENT SERVICES | Facility: HOSPITAL | Age: 82
LOS: 1 days | Discharge: HOME | End: 2022-01-12
Payer: MEDICARE

## 2022-01-12 VITALS
RESPIRATION RATE: 16 BRPM | SYSTOLIC BLOOD PRESSURE: 128 MMHG | OXYGEN SATURATION: 97 % | HEIGHT: 66 IN | DIASTOLIC BLOOD PRESSURE: 60 MMHG | HEART RATE: 67 BPM | WEIGHT: 169.98 LBS | TEMPERATURE: 98 F

## 2022-01-12 VITALS
RESPIRATION RATE: 14 BRPM | HEART RATE: 60 BPM | SYSTOLIC BLOOD PRESSURE: 105 MMHG | DIASTOLIC BLOOD PRESSURE: 70 MMHG | TEMPERATURE: 98 F | OXYGEN SATURATION: 96 %

## 2022-01-12 DIAGNOSIS — R52 PAIN, UNSPECIFIED: Chronic | ICD-10-CM

## 2022-01-12 DIAGNOSIS — Z98.890 OTHER SPECIFIED POSTPROCEDURAL STATES: Chronic | ICD-10-CM

## 2022-01-12 DIAGNOSIS — M79.671 PAIN IN RIGHT FOOT: ICD-10-CM

## 2022-01-12 DIAGNOSIS — M20.12 HALLUX VALGUS (ACQUIRED), LEFT FOOT: Chronic | ICD-10-CM

## 2022-01-12 PROCEDURE — 73630 X-RAY EXAM OF FOOT: CPT | Mod: 26,RT

## 2022-01-12 DEVICE — IMPLANTABLE DEVICE: Type: IMPLANTABLE DEVICE | Status: FUNCTIONAL

## 2022-01-12 DEVICE — K-WIRE OSTEOMED .035" X 4": Type: IMPLANTABLE DEVICE | Status: FUNCTIONAL

## 2022-01-12 RX ORDER — CHOLECALCIFEROL (VITAMIN D3) 125 MCG
1 CAPSULE ORAL
Qty: 0 | Refills: 0 | DISCHARGE

## 2022-01-12 RX ORDER — CALCIUM CARBONATE 500(1250)
1 TABLET ORAL
Qty: 0 | Refills: 0 | DISCHARGE

## 2022-01-12 RX ORDER — MULTIVIT-MIN/FERROUS GLUCONATE 9 MG/15 ML
1 LIQUID (ML) ORAL
Qty: 0 | Refills: 0 | DISCHARGE

## 2022-01-12 RX ORDER — PREGABALIN 225 MG/1
1 CAPSULE ORAL
Qty: 0 | Refills: 0 | DISCHARGE

## 2022-01-12 RX ORDER — LEVOTHYROXINE SODIUM 125 MCG
1 TABLET ORAL
Qty: 0 | Refills: 0 | DISCHARGE

## 2022-01-12 RX ORDER — ASPIRIN/CALCIUM CARB/MAGNESIUM 324 MG
1 TABLET ORAL
Qty: 0 | Refills: 0 | DISCHARGE

## 2022-01-12 NOTE — ASU DISCHARGE PLAN (ADULT/PEDIATRIC) - CARE PROVIDER_API CALL
Ted Mccarty (DPM)  Podiatric Medicine and Surgery  [address] [place] [address]  Phone: [phone]  Fax: [phone]  Follow Up Time:

## 2022-01-12 NOTE — ASU PREOP CHECKLIST - VERIFY SURGICAL SITE/SIDE WITH PATIENT
right foot yaa bunionectomy, 2nd toe fusion with weil osteotomy right foot yaa bunionectomy, 2nd toe fusion with weil osteotomy/done

## 2022-01-12 NOTE — ASU DISCHARGE PLAN (ADULT/PEDIATRIC) - NS MD DC FALL RISK RISK
For information on Fall & Injury Prevention, visit: [email or web address] OR  [email or web address] OR  https://www.cdc.gov/steadi/patient.html

## 2022-01-12 NOTE — ASU PREOP CHECKLIST - LAST TOOK
sip of water at 0700/clears
Render In Strict Bullet Format?: No
Continue Regimen: Doxycycline 100mg bid \\nMupriocin bid
Detail Level: Zone
Initiate Treatment: Clindamycin topical to any open lesion.

## 2022-01-12 NOTE — ASU DISCHARGE PLAN (ADULT/PEDIATRIC) - ASU DC SPECIAL INSTRUCTIONSFT
keep dressing clean dry and intact  weightbearing as tolerated in surgical show with weight to the heel  follow up in 1 week

## 2022-01-12 NOTE — ASU PREOPERATIVE ASSESSMENT, ADULT (IPARK ONLY) - FALL HARM RISK - UNIVERSAL INTERVENTIONS
Bed in lowest position, wheels locked, appropriate side rails in place/Call bell, personal items and telephone in reach/Instruct patient to call for assistance before getting out of bed or chair/Non-slip footwear when patient is out of bed/[place] to call system/Physically safe environment - no spills, clutter or unnecessary equipment/Purposeful Proactive Rounding/Room/bathroom lighting operational, light cord in reach

## 2022-01-13 ENCOUNTER — NON-APPOINTMENT (OUTPATIENT)
Age: 82
End: 2022-01-13

## 2022-01-13 PROBLEM — R05.9 COUGH, UNSPECIFIED: Chronic | Status: ACTIVE | Noted: 2021-12-30

## 2022-01-13 PROBLEM — M20.11 HALLUX VALGUS (ACQUIRED), RIGHT FOOT: Chronic | Status: ACTIVE | Noted: 2021-12-30

## 2022-01-13 LAB
CORE LAB BIOPSY: NORMAL
CYTOLOGY CVX/VAG DOC THIN PREP: ABNORMAL
HPV HIGH+LOW RISK DNA PNL CVX: DETECTED

## 2022-03-07 NOTE — H&P PST ADULT - OPHTHALMOLOGIC
Writer spoke with patient.Patient is scheduled to come in on 03/09/2022 for OV, Echo, and event monitor for further cardiac evaluation and treatment . Patient was given date, time, and location of appointments. Telephone number was also provided to our clinic.Test instructions was given. Patient was inform that a referral is needed for the OV. Patient verbalized understanding.   details…

## 2022-04-06 DIAGNOSIS — Z12.11 ENCOUNTER FOR SCREENING FOR MALIGNANT NEOPLASM OF COLON: ICD-10-CM

## 2022-04-06 DIAGNOSIS — U07.1 COVID-19: ICD-10-CM

## 2022-04-26 LAB — SARS-COV-2 N GENE NPH QL NAA+PROBE: NOT DETECTED

## 2022-04-27 ENCOUNTER — APPOINTMENT (OUTPATIENT)
Dept: COLORECTAL SURGERY | Facility: CLINIC | Age: 82
End: 2022-04-27
Payer: MEDICARE

## 2022-04-27 PROCEDURE — 45378 DIAGNOSTIC COLONOSCOPY: CPT

## 2022-05-30 NOTE — ASU PREOP CHECKLIST - BLOOD AVAILABLE
called patient about positive influenza, reviewed symptom management, return precautions -Cindy Villegas PA-C
n/a

## 2022-07-08 ENCOUNTER — APPOINTMENT (OUTPATIENT)
Dept: OBGYN | Facility: CLINIC | Age: 82
End: 2022-07-08

## 2022-07-08 VITALS
SYSTOLIC BLOOD PRESSURE: 136 MMHG | HEART RATE: 65 BPM | BODY MASS INDEX: 27.32 KG/M2 | DIASTOLIC BLOOD PRESSURE: 83 MMHG | WEIGHT: 170 LBS | HEIGHT: 66 IN

## 2022-07-08 DIAGNOSIS — R92.2 INCONCLUSIVE MAMMOGRAM: ICD-10-CM

## 2022-07-08 PROCEDURE — 99213 OFFICE O/P EST LOW 20 MIN: CPT

## 2022-07-08 NOTE — PHYSICAL EXAM
[Chaperone Present] : A chaperone was present in the examining room during all aspects of the physical examination [No Acute Distress] : in no acute distress [Well developed] : well developed [Well Nourished] : ~L well nourished [Good Hygeine] : demonstrates good hygeine [Oriented x3] : oriented to person, place, and time [Normal Memory] : ~T memory was ~L unimpaired [Normal Mood/Affect] : mood and affect are normal [Normal Lung Sounds] : the lungs were clear to auscultation [Respirations regular] : ~T respiratory rate was regular [Rate & Rhythm Regular] : ~T heart rate and rhythm were normal [No Edema] : ~T edema was not present [Supple] : ~T the neck demonstrated no ~M decrease in suppleness [Thyroid Normal] : the thyroid ~T showed no abnormalities [Symmetrical] : the neck was ~L symmetrical [Warm and Dry] : was warm and dry to touch [Turgor Normal] : skin turgor ~T was normal [Vulvar Atrophy] : vulvar atrophy [Labia Majora] : were normal [Labia Minora] : were normal [Bartholin's Gland] : both Bartholin's glands were normal  [Mucosal Prolapse] : had a mucosal prolapse [Normal Appearance] : general appearance was normal [Atrophy] : atrophy [3] : 3 [] : I [Erosion] : had erosions [Uterine Adnexae] : were not tender and not enlarged [Normal rectal exam] : was normal [Normal] : was normal [None] : no [FreeTextEntry1] : edilson [Mass] : no breast mass [Tender] : no tenderness [Nipple Discharge] : no nipple discharge [Mass (___ Cm)] : no ~M [unfilled] abdominal mass was palpated [Tenderness] : ~T no ~M abdominal tenderness observed [H/Smegaly] : no hepatosplenomegaly [Supraclavicular LAD] : no adenopathy noted in supraclavicular lymph nodes [Axillary LAD] : no adenopathy was noted in axillary nodes [Inguinal LAD] : no adenopathy was noted in the inguinal lymph nodes [Rash/Lesion] : no rash or lesion was noted [Estrogen Effect] : no estrogen effect was observed
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
## 2022-07-09 LAB
APPEARANCE: CLEAR
BACTERIA: NEGATIVE
BILIRUBIN URINE: NEGATIVE
BLOOD URINE: NEGATIVE
COLOR: NORMAL
GLUCOSE QUALITATIVE U: NEGATIVE
HYALINE CASTS: 0 /LPF
KETONES URINE: NEGATIVE
LEUKOCYTE ESTERASE URINE: NEGATIVE
MICROSCOPIC-UA: NORMAL
NITRITE URINE: NEGATIVE
PH URINE: 7
PROTEIN URINE: NEGATIVE
RED BLOOD CELLS URINE: 2 /HPF
SPECIFIC GRAVITY URINE: 1.02
SQUAMOUS EPITHELIAL CELLS: 0 /HPF
UROBILINOGEN URINE: NORMAL
WHITE BLOOD CELLS URINE: 0 /HPF

## 2022-07-10 LAB — BACTERIA UR CULT: NORMAL

## 2022-07-11 ENCOUNTER — NON-APPOINTMENT (OUTPATIENT)
Age: 82
End: 2022-07-11

## 2022-07-13 LAB — URINE CYTOLOGY: NORMAL

## 2022-08-31 ENCOUNTER — APPOINTMENT (OUTPATIENT)
Dept: ULTRASOUND IMAGING | Facility: IMAGING CENTER | Age: 82
End: 2022-08-31

## 2022-09-14 ENCOUNTER — APPOINTMENT (OUTPATIENT)
Dept: ULTRASOUND IMAGING | Facility: IMAGING CENTER | Age: 82
End: 2022-09-14

## 2022-09-14 ENCOUNTER — OUTPATIENT (OUTPATIENT)
Dept: OUTPATIENT SERVICES | Facility: HOSPITAL | Age: 82
LOS: 1 days | End: 2022-09-14
Payer: MEDICARE

## 2022-09-14 ENCOUNTER — RESULT REVIEW (OUTPATIENT)
Age: 82
End: 2022-09-14

## 2022-09-14 DIAGNOSIS — Z98.890 OTHER SPECIFIED POSTPROCEDURAL STATES: Chronic | ICD-10-CM

## 2022-09-14 DIAGNOSIS — Z00.00 ENCOUNTER FOR GENERAL ADULT MEDICAL EXAMINATION WITHOUT ABNORMAL FINDINGS: ICD-10-CM

## 2022-09-14 DIAGNOSIS — R52 PAIN, UNSPECIFIED: Chronic | ICD-10-CM

## 2022-09-14 DIAGNOSIS — M20.12 HALLUX VALGUS (ACQUIRED), LEFT FOOT: Chronic | ICD-10-CM

## 2022-09-14 PROCEDURE — 76856 US EXAM PELVIC COMPLETE: CPT | Mod: 26

## 2022-09-14 PROCEDURE — 76830 TRANSVAGINAL US NON-OB: CPT | Mod: 26

## 2022-09-14 PROCEDURE — 76700 US EXAM ABDOM COMPLETE: CPT | Mod: 26

## 2022-09-14 PROCEDURE — 76856 US EXAM PELVIC COMPLETE: CPT

## 2022-09-14 PROCEDURE — 76700 US EXAM ABDOM COMPLETE: CPT

## 2022-09-14 PROCEDURE — 76830 TRANSVAGINAL US NON-OB: CPT

## 2022-09-19 ENCOUNTER — NON-APPOINTMENT (OUTPATIENT)
Age: 82
End: 2022-09-19

## 2022-10-21 ENCOUNTER — APPOINTMENT (OUTPATIENT)
Dept: MAMMOGRAPHY | Facility: IMAGING CENTER | Age: 82
End: 2022-10-21

## 2022-10-21 ENCOUNTER — APPOINTMENT (OUTPATIENT)
Dept: ULTRASOUND IMAGING | Facility: IMAGING CENTER | Age: 82
End: 2022-10-21

## 2022-10-25 ENCOUNTER — RESULT REVIEW (OUTPATIENT)
Age: 82
End: 2022-10-25

## 2022-10-25 ENCOUNTER — APPOINTMENT (OUTPATIENT)
Dept: MAMMOGRAPHY | Facility: IMAGING CENTER | Age: 82
End: 2022-10-25

## 2022-10-25 ENCOUNTER — APPOINTMENT (OUTPATIENT)
Dept: ULTRASOUND IMAGING | Facility: IMAGING CENTER | Age: 82
End: 2022-10-25

## 2022-10-25 ENCOUNTER — OUTPATIENT (OUTPATIENT)
Dept: OUTPATIENT SERVICES | Facility: HOSPITAL | Age: 82
LOS: 1 days | End: 2022-10-25
Payer: MEDICARE

## 2022-10-25 DIAGNOSIS — M20.12 HALLUX VALGUS (ACQUIRED), LEFT FOOT: Chronic | ICD-10-CM

## 2022-10-25 DIAGNOSIS — N60.01 SOLITARY CYST OF RIGHT BREAST: ICD-10-CM

## 2022-10-25 DIAGNOSIS — N64.9 DISORDER OF BREAST, UNSPECIFIED: ICD-10-CM

## 2022-10-25 DIAGNOSIS — R92.2 INCONCLUSIVE MAMMOGRAM: ICD-10-CM

## 2022-10-25 DIAGNOSIS — Z00.00 ENCOUNTER FOR GENERAL ADULT MEDICAL EXAMINATION WITHOUT ABNORMAL FINDINGS: ICD-10-CM

## 2022-10-25 DIAGNOSIS — R52 PAIN, UNSPECIFIED: Chronic | ICD-10-CM

## 2022-10-25 DIAGNOSIS — Z98.890 OTHER SPECIFIED POSTPROCEDURAL STATES: Chronic | ICD-10-CM

## 2022-10-25 PROCEDURE — 76641 ULTRASOUND BREAST COMPLETE: CPT | Mod: 26,50

## 2022-10-25 PROCEDURE — 76641 ULTRASOUND BREAST COMPLETE: CPT

## 2022-10-25 PROCEDURE — 77067 SCR MAMMO BI INCL CAD: CPT

## 2022-10-25 PROCEDURE — 77067 SCR MAMMO BI INCL CAD: CPT | Mod: 26

## 2022-10-25 PROCEDURE — 77063 BREAST TOMOSYNTHESIS BI: CPT | Mod: 26

## 2022-10-25 PROCEDURE — 77063 BREAST TOMOSYNTHESIS BI: CPT

## 2022-11-30 ENCOUNTER — OFFICE (OUTPATIENT)
Dept: URBAN - METROPOLITAN AREA CLINIC 27 | Facility: CLINIC | Age: 82
Setting detail: OPHTHALMOLOGY
End: 2022-11-30
Payer: MEDICARE

## 2022-11-30 DIAGNOSIS — H43.393: ICD-10-CM

## 2022-11-30 DIAGNOSIS — H35.40: ICD-10-CM

## 2022-11-30 DIAGNOSIS — H35.373: ICD-10-CM

## 2022-11-30 DIAGNOSIS — H11.153: ICD-10-CM

## 2022-11-30 DIAGNOSIS — H25.13: ICD-10-CM

## 2022-11-30 PROCEDURE — 99214 OFFICE O/P EST MOD 30 MIN: CPT | Performed by: OPHTHALMOLOGY

## 2022-11-30 PROCEDURE — 92134 CPTRZ OPH DX IMG PST SGM RTA: CPT | Performed by: OPHTHALMOLOGY

## 2022-11-30 ASSESSMENT — REFRACTION_MANIFEST
OD_CYLINDER: +1.75
OS_VA1: 20/20
OD_SPHERE: -1.25
OD_VA2: 20/40(J3)
OD_VA1: 20/40
OS_ADD: +4.00
OS_CYLINDER: +0.75
OD_AXIS: 180
OS_SPHERE: +2.50
OD_AXIS: 170
OS_VA1: 20/25
OD_ADD: +4.00
OS_CYLINDER: +1.25
OD_AXIS: 180
OD_CYLINDER: +2.00
OS_AXIS: 180
OS_VA2: 20/40(J3)
OS_AXIS: 000
OS_CYLINDER: +1.25
OS_AXIS: 010
OD_SPHERE: +0.75
OS_VA1: 20/30-1
OD_CYLINDER: +1.50
OS_SPHERE: +1.75
OD_VA1: 20/60+-
OS_SPHERE: +2.00
OD_SPHERE: PLANO
OD_VA1: 20/80-1

## 2022-11-30 ASSESSMENT — REFRACTION_CURRENTRX
OD_OVR_VA: 20/
OS_VPRISM_DIRECTION: SV
OS_CYLINDER: +0.75
OD_OVR_VA: 20/
OS_AXIS: 008
OS_OVR_VA: 20/
OD_SPHERE: +1.25
OD_VPRISM_DIRECTION: SV
OS_AXIS: 008
OD_AXIS: 153
OD_CYLINDER: +0.75
OS_OVR_VA: 20/
OS_VPRISM_DIRECTION: SV
OD_SPHERE: +1.75
OD_SPHERE: +1.25
OS_SPHERE: +1.25
OD_VPRISM_DIRECTION: SV
OS_AXIS: 006
OD_OVR_VA: 20/
OD_AXIS: 020
OS_CYLINDER: +0.75
OS_SPHERE: +1.75
OS_CYLINDER: +0.75
OD_AXIS: 019
OS_SPHERE: +3.00
OD_CYLINDER: +0.75
OS_OVR_VA: 20/
OD_CYLINDER: +0.75
OS_SPHERE: +1.75
OD_SPHERE: +3.00

## 2022-11-30 ASSESSMENT — REFRACTION_AUTOREFRACTION
OS_AXIS: 180
OD_SPHERE: -2.00
OD_CYLINDER: +2.00
OD_AXIS: 173
OS_CYLINDER: +1.00
OS_SPHERE: +2.50

## 2022-11-30 ASSESSMENT — SPHEQUIV_DERIVED
OS_SPHEQUIV: 2.875
OD_SPHEQUIV: -1
OS_SPHEQUIV: 3
OS_SPHEQUIV: 2.375
OD_SPHEQUIV: -0.25
OD_SPHEQUIV: 1.5
OS_SPHEQUIV: 2.625

## 2022-11-30 ASSESSMENT — CONFRONTATIONAL VISUAL FIELD TEST (CVF)
OD_FINDINGS: FULL
OS_FINDINGS: FULL

## 2022-11-30 ASSESSMENT — TONOMETRY
OS_IOP_MMHG: 10
OD_IOP_MMHG: 11

## 2022-11-30 ASSESSMENT — VISUAL ACUITY
OS_BCVA: 20/70-1
OD_BCVA: 20/20-2

## 2022-11-30 ASSESSMENT — LID EXAM ASSESSMENTS: OD_COMMENTS: TELANGIECTASIA

## 2023-01-09 ENCOUNTER — OFFICE (OUTPATIENT)
Dept: URBAN - METROPOLITAN AREA CLINIC 27 | Facility: CLINIC | Age: 83
Setting detail: OPHTHALMOLOGY
End: 2023-01-09
Payer: MEDICARE

## 2023-01-09 DIAGNOSIS — H11.153: ICD-10-CM

## 2023-01-09 DIAGNOSIS — H25.11: ICD-10-CM

## 2023-01-09 DIAGNOSIS — H25.13: ICD-10-CM

## 2023-01-09 PROCEDURE — 92136 OPHTHALMIC BIOMETRY: CPT | Performed by: OPHTHALMOLOGY

## 2023-01-09 PROCEDURE — 99213 OFFICE O/P EST LOW 20 MIN: CPT | Performed by: OPHTHALMOLOGY

## 2023-01-09 ASSESSMENT — REFRACTION_AUTOREFRACTION
OS_AXIS: 003
OD_AXIS: 174
OS_CYLINDER: +1.25
OS_SPHERE: +2.25
OD_CYLINDER: +2.25
OD_SPHERE: -2.25

## 2023-01-09 ASSESSMENT — REFRACTION_MANIFEST
OS_AXIS: 010
OD_VA1: 20/40
OS_AXIS: 000
OS_CYLINDER: +1.25
OS_AXIS: 180
OS_VA1: 20/20
OS_SPHERE: +2.50
OS_SPHERE: +1.75
OD_VA1: 20/80-1
OS_ADD: +4.00
OD_AXIS: 180
OS_VA1: 20/30-1
OS_CYLINDER: +0.75
OD_VA1: 20/60+-
OD_CYLINDER: +2.00
OS_SPHERE: +2.00
OD_SPHERE: +0.75
OD_SPHERE: -1.25
OD_VA2: 20/40(J3)
OD_CYLINDER: +1.50
OD_CYLINDER: +1.75
OD_AXIS: 180
OD_AXIS: 170
OS_VA2: 20/40(J3)
OD_ADD: +4.00
OS_VA1: 20/25
OS_CYLINDER: +1.25
OD_SPHERE: PLANO

## 2023-01-09 ASSESSMENT — AXIALLENGTH_DERIVED
OD_AL: 23.4152
OS_AL: 22.2792
OD_AL: 23.7559
OS_AL: 22.1927
OS_AL: 22.3664
OS_AL: 22.1927
OD_AL: 22.7623

## 2023-01-09 ASSESSMENT — SPHEQUIV_DERIVED
OD_SPHEQUIV: -0.25
OD_SPHEQUIV: 1.5
OS_SPHEQUIV: 2.625
OS_SPHEQUIV: 2.875
OD_SPHEQUIV: -1.125
OS_SPHEQUIV: 2.875
OS_SPHEQUIV: 2.375

## 2023-01-09 ASSESSMENT — LID EXAM ASSESSMENTS: OD_COMMENTS: TELANGIECTASIA

## 2023-01-09 ASSESSMENT — REFRACTION_CURRENTRX
OS_OVR_VA: 20/
OS_CYLINDER: +0.75
OD_SPHERE: +3.00
OS_AXIS: 008
OD_AXIS: 020
OD_VPRISM_DIRECTION: SV
OD_CYLINDER: +0.75
OS_CYLINDER: +0.75
OS_CYLINDER: +0.75
OD_OVR_VA: 20/
OD_CYLINDER: +0.75
OS_AXIS: 008
OD_SPHERE: +1.25
OS_VPRISM_DIRECTION: SV
OS_OVR_VA: 20/
OS_SPHERE: +1.75
OS_SPHERE: +1.25
OD_OVR_VA: 20/
OD_OVR_VA: 20/
OD_VPRISM_DIRECTION: SV
OS_VPRISM_DIRECTION: SV
OS_SPHERE: +1.75
OD_AXIS: 019
OS_OVR_VA: 20/
OD_SPHERE: +1.25
OS_AXIS: 006
OD_AXIS: 153
OD_SPHERE: +1.75
OS_SPHERE: +3.00
OD_CYLINDER: +0.75

## 2023-01-09 ASSESSMENT — KERATOMETRY
METHOD_AUTO_MANUAL: AUTO
OS_K1POWER_DIOPTERS: 43.75
OS_K2POWER_DIOPTERS: 45.25
OS_AXISANGLE_DEGREES: 165
OD_AXISANGLE_DEGREES: 009
OD_K1POWER_DIOPTERS: 43.75
OD_K2POWER_DIOPTERS: 44.75

## 2023-01-09 ASSESSMENT — TONOMETRY
OS_IOP_MMHG: 11
OD_IOP_MMHG: 10

## 2023-01-09 ASSESSMENT — VISUAL ACUITY
OS_BCVA: 20/60-1
OD_BCVA: 20/50-1

## 2023-01-09 ASSESSMENT — CONFRONTATIONAL VISUAL FIELD TEST (CVF)
OS_FINDINGS: FULL
OD_FINDINGS: FULL

## 2023-01-12 ENCOUNTER — APPOINTMENT (OUTPATIENT)
Dept: OBGYN | Facility: CLINIC | Age: 83
End: 2023-01-12
Payer: MEDICARE

## 2023-01-12 ENCOUNTER — LABORATORY RESULT (OUTPATIENT)
Age: 83
End: 2023-01-12

## 2023-01-12 VITALS
BODY MASS INDEX: 27 KG/M2 | DIASTOLIC BLOOD PRESSURE: 75 MMHG | HEIGHT: 66 IN | WEIGHT: 168 LBS | SYSTOLIC BLOOD PRESSURE: 122 MMHG | HEART RATE: 70 BPM

## 2023-01-12 DIAGNOSIS — B97.7 PAPILLOMAVIRUS AS THE CAUSE OF DISEASES CLASSIFIED ELSEWHERE: ICD-10-CM

## 2023-01-12 DIAGNOSIS — L90.0 LICHEN SCLEROSUS ET ATROPHICUS: ICD-10-CM

## 2023-01-12 DIAGNOSIS — N95.9 UNSPECIFIED MENOPAUSAL AND PERIMENOPAUSAL DISORDER: ICD-10-CM

## 2023-01-12 PROCEDURE — 99213 OFFICE O/P EST LOW 20 MIN: CPT | Mod: 25

## 2023-01-12 PROCEDURE — 57456 ENDOCERV CURETTAGE W/SCOPE: CPT

## 2023-01-12 NOTE — PHYSICAL EXAM
[Chaperone Present] : A chaperone was present in the examining room during all aspects of the physical examination [Appropriately responsive] : appropriately responsive [Alert] : alert [No Acute Distress] : no acute distress [Normal] : normal external genitalia [No Lesions] : no lesions  [Labia Majora] : normal [Labia Minora] : normal [FreeTextEntry1] : No evidence LS

## 2023-01-12 NOTE — PROCEDURE
[Colposcopy] : Colposcopy  [Time out performed] : Pre-procedure time out performed.  Patient's name, date of birth and procedure confirmed. [Consent Obtained] : Consent obtained [Risks] : risks [Benefits] : benefits [Alternatives] : alternatives [Patient] : patient [Infection] : infection [Bleeding] : bleeding [Allergic Reaction] : allergic reaction [HPV High Risk] : HPV high risk [ECC Performed] : ECC performed [No Abnormalities] : no abnormalities [Hemostasis Obtained] : Hemostasis obtained [Tolerated Well] : the patient tolerated the procedure well [de-identified] : TZNFV

## 2023-01-23 ENCOUNTER — NON-APPOINTMENT (OUTPATIENT)
Age: 83
End: 2023-01-23

## 2023-01-23 ENCOUNTER — ASC (OUTPATIENT)
Dept: URBAN - METROPOLITAN AREA SURGERY 8 | Facility: SURGERY | Age: 83
Setting detail: OPHTHALMOLOGY
End: 2023-01-23
Payer: MEDICARE

## 2023-01-23 DIAGNOSIS — H57.03: ICD-10-CM

## 2023-01-23 DIAGNOSIS — H25.11: ICD-10-CM

## 2023-01-23 DIAGNOSIS — H52.211: ICD-10-CM

## 2023-01-23 PROCEDURE — 66982 XCAPSL CTRC RMVL CPLX WO ECP: CPT | Performed by: OPHTHALMOLOGY

## 2023-01-23 PROCEDURE — A9270 NON-COVERED ITEM OR SERVICE: HCPCS | Performed by: OPHTHALMOLOGY

## 2023-01-23 PROCEDURE — FEMTO CATARACT LASER: Performed by: OPHTHALMOLOGY

## 2023-01-24 ENCOUNTER — OFFICE (OUTPATIENT)
Dept: URBAN - METROPOLITAN AREA CLINIC 27 | Facility: CLINIC | Age: 83
Setting detail: OPHTHALMOLOGY
End: 2023-01-24
Payer: MEDICARE

## 2023-01-24 ENCOUNTER — RX ONLY (RX ONLY)
Age: 83
End: 2023-01-24

## 2023-01-24 DIAGNOSIS — Z96.1: ICD-10-CM

## 2023-01-24 DIAGNOSIS — H11.153: ICD-10-CM

## 2023-01-24 PROCEDURE — 99024 POSTOP FOLLOW-UP VISIT: CPT | Performed by: OPHTHALMOLOGY

## 2023-01-24 ASSESSMENT — VISUAL ACUITY
OS_BCVA: 20/25-2
OD_BCVA: 20/60-2

## 2023-01-24 ASSESSMENT — REFRACTION_MANIFEST
OS_VA2: 20/40(J3)
OD_CYLINDER: +2.00
OS_VA1: 20/25
OS_AXIS: 010
OS_AXIS: 000
OD_CYLINDER: +1.75
OS_VA1: 20/20
OD_VA1: 20/80-1
OD_AXIS: 180
OD_AXIS: 180
OD_SPHERE: +0.75
OD_SPHERE: PLANO
OS_ADD: +4.00
OD_VA1: 20/40
OS_CYLINDER: +1.25
OD_AXIS: 170
OS_SPHERE: +1.75
OS_CYLINDER: +1.25
OD_VA2: 20/40(J3)
OS_AXIS: 180
OS_CYLINDER: +0.75
OD_VA1: 20/60+-
OD_CYLINDER: +1.50
OD_ADD: +4.00
OS_SPHERE: +2.50
OS_SPHERE: +2.00
OS_VA1: 20/30-1
OD_SPHERE: -1.25

## 2023-01-24 ASSESSMENT — KERATOMETRY
OD_K2POWER_DIOPTERS: 45.00
OS_K1POWER_DIOPTERS: 43.75
OS_AXISANGLE_DEGREES: 164
METHOD_AUTO_MANUAL: AUTO
OD_K1POWER_DIOPTERS: 44.00
OS_K2POWER_DIOPTERS: 45.00
OD_AXISANGLE_DEGREES: 177

## 2023-01-24 ASSESSMENT — REFRACTION_CURRENTRX
OS_OVR_VA: 20/
OD_VPRISM_DIRECTION: SV
OS_SPHERE: +1.75
OS_OVR_VA: 20/
OD_OVR_VA: 20/
OS_CYLINDER: +0.75
OD_SPHERE: +1.25
OD_OVR_VA: 20/
OS_AXIS: 006
OS_SPHERE: +3.00
OD_CYLINDER: +0.75
OD_AXIS: 019
OS_OVR_VA: 20/
OD_SPHERE: +1.25
OD_CYLINDER: +0.75
OD_CYLINDER: +0.75
OS_SPHERE: +1.75
OS_AXIS: 008
OS_VPRISM_DIRECTION: SV
OD_SPHERE: +1.75
OS_CYLINDER: +0.75
OS_VPRISM_DIRECTION: SV
OS_AXIS: 008
OD_SPHERE: +3.00
OD_AXIS: 153
OD_AXIS: 020
OS_CYLINDER: +0.75
OD_VPRISM_DIRECTION: SV
OS_SPHERE: +1.25
OD_OVR_VA: 20/

## 2023-01-24 ASSESSMENT — AXIALLENGTH_DERIVED
OD_AL: 23.325
OD_AL: 22.6771
OS_AL: 22.4078
OD_AL: 23.325
OS_AL: 22.0617
OS_AL: 22.3202
OS_AL: 22.2334

## 2023-01-24 ASSESSMENT — SPHEQUIV_DERIVED
OS_SPHEQUIV: 2.625
OS_SPHEQUIV: 3.375
OD_SPHEQUIV: -0.25
OS_SPHEQUIV: 2.875
OD_SPHEQUIV: -0.25
OD_SPHEQUIV: 1.5
OS_SPHEQUIV: 2.375

## 2023-01-24 ASSESSMENT — REFRACTION_AUTOREFRACTION
OD_CYLINDER: +0.50
OS_CYLINDER: +1.25
OD_SPHERE: -0.50
OS_AXIS: 179
OS_SPHERE: +2.75
OD_AXIS: 93

## 2023-01-24 ASSESSMENT — LID EXAM ASSESSMENTS: OD_COMMENTS: TELANGIECTASIA

## 2023-01-24 ASSESSMENT — TONOMETRY
OD_IOP_MMHG: 10
OS_IOP_MMHG: 10
OD_IOP_MMHG: 10

## 2023-01-31 ENCOUNTER — OFFICE (OUTPATIENT)
Dept: URBAN - METROPOLITAN AREA CLINIC 35 | Facility: CLINIC | Age: 83
Setting detail: OPHTHALMOLOGY
End: 2023-01-31
Payer: MEDICARE

## 2023-01-31 DIAGNOSIS — Z96.1: ICD-10-CM

## 2023-01-31 DIAGNOSIS — H16.8: ICD-10-CM

## 2023-01-31 PROCEDURE — 99024 POSTOP FOLLOW-UP VISIT: CPT | Performed by: OPHTHALMOLOGY

## 2023-01-31 ASSESSMENT — REFRACTION_MANIFEST
OD_CYLINDER: +1.75
OS_CYLINDER: +1.25
OD_CYLINDER: +1.50
OS_AXIS: 000
OS_SPHERE: +2.00
OS_AXIS: 010
OD_SPHERE: -1.25
OD_ADD: +4.00
OS_SPHERE: +1.75
OS_ADD: +4.00
OD_VA1: 20/60+-
OS_VA1: 20/25
OS_VA1: 20/20
OD_AXIS: 180
OS_CYLINDER: +0.75
OS_VA1: 20/30-1
OD_SPHERE: PLANO
OS_CYLINDER: +1.25
OD_AXIS: 170
OS_SPHERE: +2.50
OS_AXIS: 180
OD_CYLINDER: +2.00
OD_VA2: 20/40(J3)
OS_VA2: 20/40(J3)
OD_VA1: 20/40
OD_AXIS: 180
OD_VA1: 20/80-1
OD_SPHERE: +0.75

## 2023-01-31 ASSESSMENT — REFRACTION_CURRENTRX
OS_SPHERE: +1.75
OD_CYLINDER: +0.75
OS_SPHERE: +1.25
OD_AXIS: 153
OS_AXIS: 008
OS_CYLINDER: +0.75
OS_OVR_VA: 20/
OD_OVR_VA: 20/
OD_VPRISM_DIRECTION: SV
OD_VPRISM_DIRECTION: SV
OS_SPHERE: +3.00
OD_SPHERE: +1.25
OD_CYLINDER: +0.75
OS_OVR_VA: 20/
OD_OVR_VA: 20/
OS_VPRISM_DIRECTION: SV
OD_SPHERE: +3.00
OD_OVR_VA: 20/
OD_AXIS: 020
OD_AXIS: 019
OD_SPHERE: +1.75
OS_SPHERE: +1.75
OS_AXIS: 008
OS_CYLINDER: +0.75
OD_SPHERE: +1.25
OS_VPRISM_DIRECTION: SV
OS_OVR_VA: 20/
OS_AXIS: 006
OS_CYLINDER: +0.75
OD_CYLINDER: +0.75

## 2023-01-31 ASSESSMENT — REFRACTION_AUTOREFRACTION
OD_AXIS: 135
OS_CYLINDER: +1.50
OS_AXIS: 180
OD_SPHERE: -1.25
OD_CYLINDER: +0.50
OS_SPHERE: +2.00

## 2023-01-31 ASSESSMENT — SPHEQUIV_DERIVED
OS_SPHEQUIV: 2.625
OS_SPHEQUIV: 2.75
OD_SPHEQUIV: -0.25
OS_SPHEQUIV: 2.875
OD_SPHEQUIV: 1.5
OD_SPHEQUIV: -1
OS_SPHEQUIV: 2.375

## 2023-01-31 ASSESSMENT — CONFRONTATIONAL VISUAL FIELD TEST (CVF)
OS_FINDINGS: FULL
OD_FINDINGS: FULL

## 2023-01-31 ASSESSMENT — LID EXAM ASSESSMENTS: OD_COMMENTS: TELANGIECTASIA

## 2023-02-06 ENCOUNTER — OFFICE (OUTPATIENT)
Dept: URBAN - METROPOLITAN AREA CLINIC 35 | Facility: CLINIC | Age: 83
Setting detail: OPHTHALMOLOGY
End: 2023-02-06
Payer: MEDICARE

## 2023-02-06 ENCOUNTER — RX ONLY (RX ONLY)
Age: 83
End: 2023-02-06

## 2023-02-06 DIAGNOSIS — H16.8: ICD-10-CM

## 2023-02-06 DIAGNOSIS — Z96.1: ICD-10-CM

## 2023-02-06 PROBLEM — H25.12 CATARACT NUCLEAR SCLEROSIS AGE RELATED; LEFT EYE: Status: ACTIVE | Noted: 2023-01-24

## 2023-02-06 PROCEDURE — 99024 POSTOP FOLLOW-UP VISIT: CPT | Performed by: OPHTHALMOLOGY

## 2023-02-06 ASSESSMENT — REFRACTION_CURRENTRX
OD_OVR_VA: 20/
OS_CYLINDER: +0.75
OS_SPHERE: +1.75
OD_OVR_VA: 20/
OS_VPRISM_DIRECTION: SV
OD_VPRISM_DIRECTION: SV
OS_CYLINDER: +0.75
OD_OVR_VA: 20/
OS_AXIS: 006
OS_SPHERE: +1.25
OD_CYLINDER: +0.75
OD_SPHERE: +3.00
OD_VPRISM_DIRECTION: SV
OS_AXIS: 008
OS_SPHERE: +1.75
OS_AXIS: 008
OD_SPHERE: +1.25
OS_CYLINDER: +0.75
OS_OVR_VA: 20/
OD_CYLINDER: +0.75
OD_SPHERE: +1.25
OD_AXIS: 153
OD_CYLINDER: +0.75
OS_SPHERE: +3.00
OD_AXIS: 019
OS_OVR_VA: 20/
OS_OVR_VA: 20/
OD_AXIS: 020
OD_SPHERE: +1.75
OS_VPRISM_DIRECTION: SV

## 2023-02-06 ASSESSMENT — AXIALLENGTH_DERIVED
OS_AL: 31.39
OS_AL: 31.56
OS_AL: 31.56
OD_AL: 23.2803
OD_AL: 23.3278
OS_AL: 31.73
OD_AL: 22.6348

## 2023-02-06 ASSESSMENT — KERATOMETRY
OS_K1POWER_DIOPTERS: 4.00
METHOD_AUTO_MANUAL: AUTO
OS_K2POWER_DIOPTERS: 45.00
OD_AXISANGLE_DEGREES: 064
OD_K2POWER_DIOPTERS: 45.00
OD_K1POWER_DIOPTERS: 44.25
OS_AXISANGLE_DEGREES: 167

## 2023-02-06 ASSESSMENT — REFRACTION_MANIFEST
OS_SPHERE: +2.50
OS_SPHERE: +2.00
OS_AXIS: 000
OD_AXIS: 170
OD_VA2: 20/40(J3)
OD_SPHERE: -1.25
OD_SPHERE: +0.75
OS_AXIS: 010
OS_ADD: +4.00
OD_VA1: 20/80-1
OD_CYLINDER: +2.00
OD_VA1: 20/60+-
OD_ADD: +4.00
OS_CYLINDER: +0.75
OD_SPHERE: PLANO
OS_VA1: 20/20
OS_CYLINDER: +1.25
OD_AXIS: 180
OS_CYLINDER: +1.25
OD_CYLINDER: +1.75
OS_SPHERE: +1.75
OD_VA1: 20/40
OD_AXIS: 180
OS_VA2: 20/40(J3)
OS_VA1: 20/25
OS_AXIS: 180
OS_VA1: 20/30-1
OD_CYLINDER: +1.50

## 2023-02-06 ASSESSMENT — REFRACTION_AUTOREFRACTION
OD_CYLINDER: +0.25
OS_AXIS: 180
OD_SPHERE: -0.50
OS_CYLINDER: +1.25
OS_SPHERE: +2.00
OD_AXIS: 130

## 2023-02-06 ASSESSMENT — LID EXAM ASSESSMENTS: OD_COMMENTS: TELANGIECTASIA

## 2023-02-06 ASSESSMENT — SPHEQUIV_DERIVED
OS_SPHEQUIV: 2.625
OD_SPHEQUIV: -0.375
OS_SPHEQUIV: 2.375
OD_SPHEQUIV: 1.5
OS_SPHEQUIV: 2.625
OS_SPHEQUIV: 2.875
OD_SPHEQUIV: -0.25

## 2023-02-06 ASSESSMENT — CONFRONTATIONAL VISUAL FIELD TEST (CVF)
OS_FINDINGS: FULL
OD_FINDINGS: FULL

## 2023-02-06 ASSESSMENT — VISUAL ACUITY
OD_BCVA: 20/70-2
OS_BCVA: 20/25-2

## 2023-02-21 ENCOUNTER — OFFICE (OUTPATIENT)
Dept: URBAN - METROPOLITAN AREA CLINIC 35 | Facility: CLINIC | Age: 83
Setting detail: OPHTHALMOLOGY
End: 2023-02-21
Payer: MEDICARE

## 2023-02-21 DIAGNOSIS — Z96.1: ICD-10-CM

## 2023-02-21 DIAGNOSIS — H02.401: ICD-10-CM

## 2023-02-21 PROCEDURE — 99024 POSTOP FOLLOW-UP VISIT: CPT | Performed by: OPHTHALMOLOGY

## 2023-02-21 ASSESSMENT — REFRACTION_MANIFEST
OD_VA1: 20/20
OS_SPHERE: +2.00
OD_AXIS: 115
OS_SPHERE: +2.25
OD_CYLINDER: +0.50
OS_AXIS: 000
OD_VA1: 20/80-1
OD_CYLINDER: +2.00
OS_CYLINDER: +0.75
OD_SPHERE: -0.75
OD_SPHERE: -1.25
OS_SPHERE: +2.25
OS_CYLINDER: +0.75
OD_VA1: 20/20
OD_SPHERE: PLANO
OS_VA1: 20/20
OS_AXIS: 010
OS_CYLINDER: +1.25
OS_ADD: +3.50
OS_VA1: 20/20
OS_ADD: +3.50
OS_AXIS: 010
OD_AXIS: 170
OD_ADD: +3.50
OD_ADD: +2.00
OS_VA1: 20/30-1
OD_CYLINDER: SPHERE

## 2023-02-21 ASSESSMENT — CONFRONTATIONAL VISUAL FIELD TEST (CVF)
OD_FINDINGS: FULL
OS_FINDINGS: FULL

## 2023-02-21 ASSESSMENT — REFRACTION_CURRENTRX
OD_AXIS: 153
OD_OVR_VA: 20/
OS_SPHERE: +3.00
OS_SPHERE: +1.75
OS_AXIS: 008
OD_CYLINDER: +0.75
OS_VPRISM_DIRECTION: SV
OS_CYLINDER: +0.75
OS_OVR_VA: 20/
OS_OVR_VA: 20/
OS_VPRISM_DIRECTION: SV
OS_SPHERE: +1.75
OD_SPHERE: +3.00
OD_VPRISM_DIRECTION: SV
OD_CYLINDER: +0.75
OD_SPHERE: +1.25
OS_AXIS: 006
OD_CYLINDER: +0.75
OD_SPHERE: +1.75
OS_AXIS: 006
OS_CYLINDER: +0.75
OS_SPHERE: +1.25
OD_SPHERE: +1.25
OD_VPRISM_DIRECTION: SV
OD_AXIS: 020
OD_OVR_VA: 20/
OS_CYLINDER: +0.75
OD_OVR_VA: 20/
OD_AXIS: 017
OS_OVR_VA: 20/

## 2023-02-21 ASSESSMENT — KERATOMETRY
OD_K2POWER_DIOPTERS: 45.00
OD_K1POWER_DIOPTERS: 44.25
OD_AXISANGLE_DEGREES: 007
METHOD_AUTO_MANUAL: AUTO
OS_K1POWER_DIOPTERS: 44.00
OS_K2POWER_DIOPTERS: 45.00
OS_AXISANGLE_DEGREES: 171

## 2023-02-21 ASSESSMENT — VISUAL ACUITY
OD_BCVA: 20/30-2
OS_BCVA: 20/25-2

## 2023-02-21 ASSESSMENT — LID EXAM ASSESSMENTS: OD_COMMENTS: TELANGIECTASIA

## 2023-02-21 ASSESSMENT — REFRACTION_AUTOREFRACTION
OD_SPHERE: -0.75
OS_SPHERE: +2.00
OD_CYLINDER: +0.50
OD_AXIS: 112
OS_AXIS: 001
OS_CYLINDER: +1.25

## 2023-02-21 ASSESSMENT — AXIALLENGTH_DERIVED
OD_AL: 23.3755
OS_AL: 22.2792
OS_AL: 22.2792
OD_AL: 23.3755
OD_AL: 23.2803
OS_AL: 22.2792
OS_AL: 22.2792

## 2023-02-21 ASSESSMENT — SPHEQUIV_DERIVED
OS_SPHEQUIV: 2.625
OD_SPHEQUIV: -0.5
OD_SPHEQUIV: -0.5
OS_SPHEQUIV: 2.625
OD_SPHEQUIV: -0.25

## 2023-02-21 ASSESSMENT — LID POSITION - PTOSIS: OD_PTOSIS: RUL T

## 2023-02-21 ASSESSMENT — TONOMETRY
OD_IOP_MMHG: 11
OS_IOP_MMHG: 11

## 2023-03-27 ENCOUNTER — APPOINTMENT (OUTPATIENT)
Dept: OBGYN | Facility: CLINIC | Age: 83
End: 2023-03-27
Payer: MEDICARE

## 2023-03-27 VITALS
DIASTOLIC BLOOD PRESSURE: 80 MMHG | WEIGHT: 170 LBS | HEIGHT: 66 IN | BODY MASS INDEX: 27.32 KG/M2 | HEART RATE: 65 BPM | SYSTOLIC BLOOD PRESSURE: 130 MMHG

## 2023-03-27 DIAGNOSIS — L98.9 DISORDER OF THE SKIN AND SUBCUTANEOUS TISSUE, UNSPECIFIED: ICD-10-CM

## 2023-03-27 PROCEDURE — 99213 OFFICE O/P EST LOW 20 MIN: CPT

## 2023-03-27 RX ORDER — CLOBETASOL PROPIONATE 0.5 MG/G
0.05 CREAM TOPICAL
Qty: 1 | Refills: 6 | Status: COMPLETED | COMMUNITY
Start: 2020-11-16 | End: 2023-03-27
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
## 2023-03-27 NOTE — HISTORY OF PRESENT ILLNESS
[x2] : nocturia two times a night [Sexual Dysfunction, NOS] : no [Stress Incontinence] : mild [Urinary Frequency] : moderate [Cystocele (Obstetric)] : bladder prolapse [Uterine Prolapse] : uterine prolapse [Vaginal Wall Prolapse] : vaginal prolapse [Rectal Prolapse] : rectal prolapse [Constipation Obstructed Defecation] : constipation [Incomplete Emptying Of Stool] : incomplete defecation [Stool Visible Blood] : blood in the stool [Diarrhea] : diarrhea [Unable To Restrain Bowel Movement] : fecal incontinence [Urge Incontinence Of Urine] : urge incontinence [Feelings Of Urinary Urgency] : urinary urgency [Pain During Urination (Dysuria)] : dysuria [Urinary Tract Infection] : urinary tract infection [Incomplete Emptying Of Bladder] : incomplete empyting of bladder [Hematuria] : hematuria [Urinary Stream Starts And Stops] : intermittent urine stream [Pelvic Pain] : pelvic pain [Urinary Frequency More Than Twice At Night (Nocturia)] : nocturia [Vaginal Pain] : vaginal pain [Vulvar Pain] : vulva pain [Bladder Pain] : bladder pain [Rectal Pain] : rectal pain [Back Pain] : flank/back pain [] : no [FreeTextEntry1] : Occasional stress and urge incontinence is doing Kegel exercises on her own and tried formal physical therapy and is now doing this on her own; DOING PTNS FINISHED SERIES NOW OCCASIONAL BOOSTER TREATMENTS\par Lichen sclerosus on clobetasol feeling better\par Vulvar itching is basically resolved \par Estrogen cream twice a week and a quarter to have a gram only D/C'D 2020

## 2023-03-27 NOTE — PHYSICAL EXAM
[Chaperone Present] : A chaperone was present in the examining room during all aspects of the physical examination [No Acute Distress] : in no acute distress [Well developed] : well developed [Well Nourished] : ~L well nourished [Good Hygeine] : demonstrates good hygeine [Oriented x3] : oriented to person, place, and time [Normal Memory] : ~T memory was ~L unimpaired [Normal Mood/Affect] : mood and affect are normal [Normal Lung Sounds] : the lungs were clear to auscultation [Respirations regular] : ~T respiratory rate was regular [Rate & Rhythm Regular] : ~T heart rate and rhythm were normal [No Edema] : ~T edema was not present [Supple] : ~T the neck demonstrated no ~M decrease in suppleness [Thyroid Normal] : the thyroid ~T showed no abnormalities [Symmetrical] : the neck was ~L symmetrical [Warm and Dry] : was warm and dry to touch [Turgor Normal] : skin turgor ~T was normal [Vulvar Atrophy] : vulvar atrophy [Labia Majora] : were normal [Labia Minora] : were normal [Bartholin's Gland] : both Bartholin's glands were normal  [Mucosal Prolapse] : had a mucosal prolapse [Normal Appearance] : general appearance was normal [Atrophy] : atrophy [3] : 3 [] : I [Erosion] : had erosions [Uterine Adnexae] : were not tender and not enlarged [Normal rectal exam] : was normal [Normal] : was normal [None] : no [FreeTextEntry1] : Diana [Mass] : no breast mass [Tender] : no tenderness [Nipple Discharge] : no nipple discharge [Mass (___ Cm)] : no ~M [unfilled] abdominal mass was palpated [Tenderness] : ~T no ~M abdominal tenderness observed [H/Smegaly] : no hepatosplenomegaly [Supraclavicular LAD] : no adenopathy noted in supraclavicular lymph nodes [Axillary LAD] : no adenopathy was noted in axillary nodes [Inguinal LAD] : no adenopathy was noted in the inguinal lymph nodes [Rash/Lesion] : no rash or lesion was noted [Estrogen Effect] : no estrogen effect was observed

## 2023-03-28 LAB
APPEARANCE: CLEAR
BILIRUBIN URINE: NEGATIVE
BLOOD URINE: NEGATIVE
COLOR: YELLOW
GLUCOSE QUALITATIVE U: NEGATIVE
KETONES URINE: NEGATIVE
LEUKOCYTE ESTERASE URINE: NEGATIVE
NITRITE URINE: NEGATIVE
PH URINE: 6
PROTEIN URINE: NEGATIVE
SPECIFIC GRAVITY URINE: 1.02
URINE CYTOLOGY: NORMAL
UROBILINOGEN URINE: NORMAL

## 2023-03-29 LAB — BACTERIA UR CULT: NORMAL

## 2023-03-30 ENCOUNTER — RESULT REVIEW (OUTPATIENT)
Age: 83
End: 2023-03-30

## 2023-04-03 ENCOUNTER — NON-APPOINTMENT (OUTPATIENT)
Age: 83
End: 2023-04-03

## 2023-04-14 ENCOUNTER — NON-APPOINTMENT (OUTPATIENT)
Age: 83
End: 2023-04-14

## 2023-05-22 ENCOUNTER — OFFICE (OUTPATIENT)
Dept: URBAN - METROPOLITAN AREA CLINIC 35 | Facility: CLINIC | Age: 83
Setting detail: OPHTHALMOLOGY
End: 2023-05-22
Payer: MEDICARE

## 2023-05-22 DIAGNOSIS — H02.824: ICD-10-CM

## 2023-05-22 DIAGNOSIS — Z96.1: ICD-10-CM

## 2023-05-22 PROBLEM — H02.40 PTOSIS OF EYELID, UNSPECIFIED; RIGHT EYE: Status: ACTIVE | Noted: 2023-05-22

## 2023-05-22 PROCEDURE — 99213 OFFICE O/P EST LOW 20 MIN: CPT | Performed by: OPHTHALMOLOGY

## 2023-05-22 ASSESSMENT — REFRACTION_AUTOREFRACTION
OD_SPHERE: -0.75
OS_AXIS: 005
OS_SPHERE: +2.50
OD_AXIS: 120
OS_CYLINDER: +1.25
OD_CYLINDER: +0.50

## 2023-05-22 ASSESSMENT — REFRACTION_CURRENTRX
OD_SPHERE: +3.00
OS_SPHERE: +2.25
OD_SPHERE: PLANO
OS_CYLINDER: +0.75
OD_VPRISM_DIRECTION: SV
OS_AXIS: 008
OD_SPHERE: +2.00
OS_SPHERE: +3.00
OS_OVR_VA: 20/
OD_VPRISM_DIRECTION: SV
OS_CYLINDER: +0.75
OD_CYLINDER: SPHERE
OD_CYLINDER: SPHERE
OD_VPRISM_DIRECTION: SV
OD_VPRISM_DIRECTION: SV
OS_SPHERE: +5.75
OS_AXIS: 015
OD_CYLINDER: +0.75
OS_CYLINDER: +0.75
OS_SPHERE: +5.75
OS_AXIS: 015
OS_VPRISM_DIRECTION: SV
OS_SPHERE: +2.25
OD_AXIS: 020
OS_VPRISM_DIRECTION: SV
OS_CYLINDER: +0.75
OD_OVR_VA: 20/
OS_AXIS: 005
OD_SPHERE: PLANO
OS_VPRISM_DIRECTION: SV
OD_CYLINDER: SPHERE
OS_SPHERE: +1.75
OS_VPRISM_DIRECTION: SV
OS_CYLINDER: +0.75
OD_CYLINDER: SPHERE
OS_AXIS: 005
OD_SPHERE: +1.25
OD_SPHERE: +2.00

## 2023-05-22 ASSESSMENT — AXIALLENGTH_DERIVED
OS_AL: 22.2792
OD_AL: 23.3755
OD_AL: 23.2803
OS_AL: 22.1068
OD_AL: 23.3755
OS_AL: 22.2792
OS_AL: 22.2359

## 2023-05-22 ASSESSMENT — SPHEQUIV_DERIVED
OD_SPHEQUIV: -0.5
OD_SPHEQUIV: -0.25
OS_SPHEQUIV: 2.625
OS_SPHEQUIV: 2.625
OS_SPHEQUIV: 3.125
OS_SPHEQUIV: 2.75
OD_SPHEQUIV: -0.5

## 2023-05-22 ASSESSMENT — REFRACTION_MANIFEST
OS_AXIS: 010
OS_AXIS: 010
OD_ADD: +2.50
OS_CYLINDER: +0.75
OD_SPHERE: -0.75
OD_VA1: 20/20
OS_CYLINDER: +0.75
OD_VA1: 20/20
OD_ADD: +2.00
OS_SPHERE: +2.25
OS_VA1: 20/20
OD_AXIS: 120
OS_ADD: +3.50
OD_CYLINDER: +0.50
OD_CYLINDER: SPHERE
OS_SPHERE: +2.25
OD_ADD: +3.50
OS_AXIS: 010
OD_SPHERE: -0.50
OD_SPHERE: PLANO
OS_VA1: 20/25+
OS_SPHERE: +2.25
OS_VA1: 20/20
OD_AXIS: 115
OS_ADD: +3.00
OD_CYLINDER: +0.50
OS_ADD: +3.50
OS_CYLINDER: +1.00

## 2023-05-22 ASSESSMENT — KERATOMETRY
OD_K2POWER_DIOPTERS: 45.00
OD_K1POWER_DIOPTERS: 44.25
METHOD_AUTO_MANUAL: AUTO
OS_K1POWER_DIOPTERS: 44.00
OS_K2POWER_DIOPTERS: 45.00
OS_AXISANGLE_DEGREES: 171
OD_AXISANGLE_DEGREES: 007

## 2023-05-22 ASSESSMENT — LID POSITION - PTOSIS: OD_PTOSIS: RUL T

## 2023-05-22 ASSESSMENT — LID EXAM ASSESSMENTS: OD_COMMENTS: TELANGIECTASIA

## 2023-05-22 ASSESSMENT — VISUAL ACUITY
OD_BCVA: 20/25-2
OS_BCVA: 20/30+2

## 2023-06-09 ENCOUNTER — OFFICE (OUTPATIENT)
Dept: URBAN - METROPOLITAN AREA CLINIC 35 | Facility: CLINIC | Age: 83
Setting detail: OPHTHALMOLOGY
End: 2023-06-09
Payer: MEDICARE

## 2023-06-09 DIAGNOSIS — D23.121: ICD-10-CM

## 2023-06-09 PROCEDURE — 92012 INTRM OPH EXAM EST PATIENT: CPT | Performed by: OPHTHALMOLOGY

## 2023-06-09 PROCEDURE — 92285 EXTERNAL OCULAR PHOTOGRAPHY: CPT | Performed by: OPHTHALMOLOGY

## 2023-06-09 ASSESSMENT — SPHEQUIV_DERIVED
OS_SPHEQUIV: 3.125
OD_SPHEQUIV: -0.5

## 2023-06-09 ASSESSMENT — KERATOMETRY
OS_K2POWER_DIOPTERS: 45.00
OD_K1POWER_DIOPTERS: 44.25
OD_K2POWER_DIOPTERS: 45.00
OS_AXISANGLE_DEGREES: 171
METHOD_AUTO_MANUAL: AUTO
OS_K1POWER_DIOPTERS: 44.00
OD_AXISANGLE_DEGREES: 007

## 2023-06-09 ASSESSMENT — REFRACTION_AUTOREFRACTION
OS_SPHERE: +2.50
OS_AXIS: 005
OS_CYLINDER: +1.25
OD_CYLINDER: +0.50
OD_AXIS: 120
OD_SPHERE: -0.75

## 2023-06-09 ASSESSMENT — VISUAL ACUITY
OD_BCVA: 20/125
OS_BCVA: 20/20-

## 2023-06-09 ASSESSMENT — CONFRONTATIONAL VISUAL FIELD TEST (CVF)
OD_FINDINGS: FULL
OS_FINDINGS: FULL

## 2023-06-09 ASSESSMENT — AXIALLENGTH_DERIVED
OS_AL: 22.1068
OD_AL: 23.3755

## 2023-06-09 ASSESSMENT — LID POSITION - PTOSIS: OD_PTOSIS: RUL T

## 2023-06-09 ASSESSMENT — LID EXAM ASSESSMENTS: OD_COMMENTS: TELANGIECTASIA

## 2023-06-30 ENCOUNTER — OFFICE (OUTPATIENT)
Dept: URBAN - METROPOLITAN AREA CLINIC 35 | Facility: CLINIC | Age: 83
Setting detail: OPHTHALMOLOGY
End: 2023-06-30
Payer: MEDICARE

## 2023-06-30 DIAGNOSIS — D23.121: ICD-10-CM

## 2023-06-30 PROCEDURE — 67840 REMOVE EYELID LESION: CPT | Performed by: OPHTHALMOLOGY

## 2023-06-30 ASSESSMENT — REFRACTION_AUTOREFRACTION
OS_AXIS: 005
OD_CYLINDER: +0.50
OD_AXIS: 120
OS_SPHERE: +2.50
OS_CYLINDER: +1.25
OD_SPHERE: -0.75

## 2023-06-30 ASSESSMENT — CONFRONTATIONAL VISUAL FIELD TEST (CVF)
OD_FINDINGS: FULL
OS_FINDINGS: FULL

## 2023-06-30 ASSESSMENT — VISUAL ACUITY
OD_BCVA: 20/60-1
OS_BCVA: 20/20-2

## 2023-06-30 ASSESSMENT — SPHEQUIV_DERIVED
OD_SPHEQUIV: -0.5
OS_SPHEQUIV: 3.125

## 2023-06-30 ASSESSMENT — LID POSITION - PTOSIS: OD_PTOSIS: RUL T

## 2023-06-30 ASSESSMENT — LID EXAM ASSESSMENTS: OD_COMMENTS: TELANGIECTASIA

## 2023-07-20 ENCOUNTER — NON-APPOINTMENT (OUTPATIENT)
Age: 83
End: 2023-07-20

## 2023-08-01 ENCOUNTER — APPOINTMENT (OUTPATIENT)
Dept: ULTRASOUND IMAGING | Facility: IMAGING CENTER | Age: 83
End: 2023-08-01
Payer: MEDICARE

## 2023-08-01 ENCOUNTER — OUTPATIENT (OUTPATIENT)
Dept: OUTPATIENT SERVICES | Facility: HOSPITAL | Age: 83
LOS: 1 days | End: 2023-08-01
Payer: MEDICARE

## 2023-08-01 DIAGNOSIS — Z98.890 OTHER SPECIFIED POSTPROCEDURAL STATES: Chronic | ICD-10-CM

## 2023-08-01 DIAGNOSIS — M20.12 HALLUX VALGUS (ACQUIRED), LEFT FOOT: Chronic | ICD-10-CM

## 2023-08-01 DIAGNOSIS — R52 PAIN, UNSPECIFIED: Chronic | ICD-10-CM

## 2023-08-01 DIAGNOSIS — N39.46 MIXED INCONTINENCE: ICD-10-CM

## 2023-08-01 DIAGNOSIS — N32.81 OVERACTIVE BLADDER: ICD-10-CM

## 2023-08-01 PROCEDURE — 76830 TRANSVAGINAL US NON-OB: CPT | Mod: 26

## 2023-08-01 PROCEDURE — 76700 US EXAM ABDOM COMPLETE: CPT

## 2023-08-01 PROCEDURE — 76857 US EXAM PELVIC LIMITED: CPT | Mod: 26

## 2023-08-01 PROCEDURE — 76700 US EXAM ABDOM COMPLETE: CPT | Mod: 26

## 2023-08-01 PROCEDURE — 76857 US EXAM PELVIC LIMITED: CPT

## 2023-08-01 PROCEDURE — 76830 TRANSVAGINAL US NON-OB: CPT

## 2023-08-09 ENCOUNTER — RX ONLY (RX ONLY)
Age: 83
End: 2023-08-09

## 2023-08-09 ENCOUNTER — OFFICE (OUTPATIENT)
Dept: URBAN - METROPOLITAN AREA CLINIC 27 | Facility: CLINIC | Age: 83
Setting detail: OPHTHALMOLOGY
End: 2023-08-09
Payer: MEDICARE

## 2023-08-09 DIAGNOSIS — D23.121: ICD-10-CM

## 2023-08-09 DIAGNOSIS — D49.2: ICD-10-CM

## 2023-08-09 PROCEDURE — 99213 OFFICE O/P EST LOW 20 MIN: CPT | Performed by: OPHTHALMOLOGY

## 2023-08-09 PROCEDURE — 67840 REMOVE EYELID LESION: CPT | Performed by: OPHTHALMOLOGY

## 2023-08-09 ASSESSMENT — CONFRONTATIONAL VISUAL FIELD TEST (CVF)
OD_FINDINGS: FULL
OS_FINDINGS: FULL

## 2023-08-09 ASSESSMENT — REFRACTION_AUTOREFRACTION
OD_CYLINDER: +0.50
OD_SPHERE: -0.75
OS_CYLINDER: +1.25
OS_SPHERE: +2.50
OD_AXIS: 120
OS_AXIS: 005

## 2023-08-09 ASSESSMENT — KERATOMETRY
OS_K1POWER_DIOPTERS: 44.00
OS_AXISANGLE_DEGREES: 171
METHOD_AUTO_MANUAL: AUTO
OS_K2POWER_DIOPTERS: 45.00
OD_K2POWER_DIOPTERS: 45.00
OD_K1POWER_DIOPTERS: 44.25
OD_AXISANGLE_DEGREES: 007

## 2023-08-09 ASSESSMENT — AXIALLENGTH_DERIVED
OD_AL: 23.3755
OS_AL: 22.1068

## 2023-08-09 ASSESSMENT — LID EXAM ASSESSMENTS: OD_COMMENTS: TELANGIECTASIA

## 2023-08-09 ASSESSMENT — SPHEQUIV_DERIVED
OS_SPHEQUIV: 3.125
OD_SPHEQUIV: -0.5

## 2023-08-09 ASSESSMENT — VISUAL ACUITY
OD_BCVA: 20/30-3
OS_BCVA: 20/20

## 2023-08-09 ASSESSMENT — LID POSITION - PTOSIS: OD_PTOSIS: RUL T

## 2023-08-24 ENCOUNTER — OFFICE (OUTPATIENT)
Dept: URBAN - METROPOLITAN AREA CLINIC 35 | Facility: CLINIC | Age: 83
Setting detail: OPHTHALMOLOGY
End: 2023-08-24
Payer: MEDICARE

## 2023-08-24 DIAGNOSIS — H25.12: ICD-10-CM

## 2023-08-24 DIAGNOSIS — H11.153: ICD-10-CM

## 2023-08-24 DIAGNOSIS — H00.15: ICD-10-CM

## 2023-08-24 DIAGNOSIS — Z96.1: ICD-10-CM

## 2023-08-24 DIAGNOSIS — H02.401: ICD-10-CM

## 2023-08-24 PROBLEM — D49.2 LID LESION ; LEFT EYE: Status: ACTIVE | Noted: 2023-08-09

## 2023-08-24 PROCEDURE — 99213 OFFICE O/P EST LOW 20 MIN: CPT | Performed by: OPHTHALMOLOGY

## 2023-08-24 ASSESSMENT — KERATOMETRY
OD_AXISANGLE_DEGREES: 007
OD_K1POWER_DIOPTERS: 44.25
METHOD_AUTO_MANUAL: AUTO
OD_K2POWER_DIOPTERS: 45.00
OS_AXISANGLE_DEGREES: 171
OS_K1POWER_DIOPTERS: 44.00
OS_K2POWER_DIOPTERS: 45.00

## 2023-08-24 ASSESSMENT — VISUAL ACUITY
OS_BCVA: 20/20
OD_BCVA: 20/150

## 2023-08-24 ASSESSMENT — SPHEQUIV_DERIVED
OD_SPHEQUIV: -0.125
OS_SPHEQUIV: 2.875

## 2023-08-24 ASSESSMENT — REFRACTION_AUTOREFRACTION
OS_CYLINDER: +1.25
OS_AXIS: 180
OS_SPHERE: +2.25
OD_CYLINDER: +0.25
OD_SPHERE: -0.25
OD_AXIS: 138

## 2023-08-24 ASSESSMENT — CONFRONTATIONAL VISUAL FIELD TEST (CVF)
OS_FINDINGS: FULL
OD_FINDINGS: FULL

## 2023-08-24 ASSESSMENT — AXIALLENGTH_DERIVED
OS_AL: 22.1927
OD_AL: 23.2329

## 2023-08-24 ASSESSMENT — LID EXAM ASSESSMENTS: OD_COMMENTS: TELANGIECTASIA

## 2023-08-24 ASSESSMENT — LID POSITION - PTOSIS: OD_PTOSIS: RUL

## 2023-09-13 ENCOUNTER — RX ONLY (RX ONLY)
Age: 83
End: 2023-09-13

## 2023-09-13 ENCOUNTER — OFFICE (OUTPATIENT)
Dept: URBAN - METROPOLITAN AREA CLINIC 35 | Facility: CLINIC | Age: 83
Setting detail: OPHTHALMOLOGY
End: 2023-09-13
Payer: MEDICARE

## 2023-09-13 DIAGNOSIS — H00.15: ICD-10-CM

## 2023-09-13 PROCEDURE — 99212 OFFICE O/P EST SF 10 MIN: CPT | Performed by: OPHTHALMOLOGY

## 2023-09-13 ASSESSMENT — AXIALLENGTH_DERIVED
OD_AL: 23.4179
OS_AL: 22.2359

## 2023-09-13 ASSESSMENT — REFRACTION_AUTOREFRACTION
OD_AXIS: 123
OS_AXIS: 004
OD_CYLINDER: +0.25
OS_CYLINDER: +1.00
OS_SPHERE: +2.25
OD_SPHERE: -0.50

## 2023-09-13 ASSESSMENT — KERATOMETRY
OS_K1POWER_DIOPTERS: 44.00
OD_K2POWER_DIOPTERS: 44.75
OD_AXISANGLE_DEGREES: 015
OD_K1POWER_DIOPTERS: 44.00
OS_K2POWER_DIOPTERS: 45.00
OS_AXISANGLE_DEGREES: 170
METHOD_AUTO_MANUAL: AUTO

## 2023-09-13 ASSESSMENT — LID POSITION - PTOSIS: OD_PTOSIS: RUL

## 2023-09-13 ASSESSMENT — LID EXAM ASSESSMENTS: OD_COMMENTS: TELANGIECTASIA

## 2023-09-13 ASSESSMENT — SPHEQUIV_DERIVED
OD_SPHEQUIV: -0.375
OS_SPHEQUIV: 2.75

## 2023-09-13 ASSESSMENT — VISUAL ACUITY
OD_BCVA: 20/125
OS_BCVA: 20/20

## 2023-10-04 ENCOUNTER — OFFICE (OUTPATIENT)
Dept: URBAN - METROPOLITAN AREA CLINIC 35 | Facility: CLINIC | Age: 83
Setting detail: OPHTHALMOLOGY
End: 2023-10-04
Payer: MEDICARE

## 2023-10-04 DIAGNOSIS — H00.15: ICD-10-CM

## 2023-10-04 PROBLEM — H02.40 PTOSIS OF EYELID, UNSPECIFIED; RIGHT EYE: Status: ACTIVE | Noted: 2023-10-04

## 2023-10-04 PROCEDURE — 99213 OFFICE O/P EST LOW 20 MIN: CPT | Performed by: OPHTHALMOLOGY

## 2023-10-04 ASSESSMENT — SPHEQUIV_DERIVED
OS_SPHEQUIV: 2.875
OD_SPHEQUIV: -0.375
OS_SPHEQUIV: 2.75
OD_SPHEQUIV: -0.25

## 2023-10-04 ASSESSMENT — REFRACTION_MANIFEST
OS_AXIS: 010
OS_ADD: +2.25
OD_CYLINDER: +0.50
OS_CYLINDER: +1.00
OD_AXIS: 120
OD_ADD: +2.50
OD_SPHERE: -0.50
OS_SPHERE: +2.25

## 2023-10-04 ASSESSMENT — REFRACTION_AUTOREFRACTION
OS_SPHERE: +2.25
OS_CYLINDER: +1.25
OD_SPHERE: -0.50
OD_CYLINDER: +0.25
OS_AXIS: 180
OD_AXIS: 131

## 2023-10-04 ASSESSMENT — REFRACTION_CURRENTRX
OS_SPHERE: +5.25
OS_AXIS: 005
OS_CYLINDER: +1.00
OS_OVR_VA: 20/
OD_AXIS: 118
OD_VPRISM_DIRECTION: SV
OD_SPHERE: +2.00
OS_VPRISM_DIRECTION: SV
OD_OVR_VA: 20/
OD_CYLINDER: +0.50

## 2023-10-04 ASSESSMENT — LID EXAM ASSESSMENTS: OD_COMMENTS: TELANGIECTASIA

## 2023-10-04 ASSESSMENT — VISUAL ACUITY
OD_BCVA: 20/100+
OS_BCVA: 20/20-2

## 2023-10-04 ASSESSMENT — LID POSITION - PTOSIS: OD_PTOSIS: RUL

## 2023-10-04 ASSESSMENT — AXIALLENGTH_DERIVED
OS_AL: 22.2359
OS_AL: 22.1927
OD_AL: 23.4179
OD_AL: 23.37

## 2023-10-04 ASSESSMENT — KERATOMETRY
OS_K1POWER_DIOPTERS: 44.00
METHOD_AUTO_MANUAL: AUTO
OD_AXISANGLE_DEGREES: 015
OS_K2POWER_DIOPTERS: 45.00
OS_AXISANGLE_DEGREES: 170
OD_K1POWER_DIOPTERS: 44.00
OD_K2POWER_DIOPTERS: 44.75

## 2023-10-23 ENCOUNTER — NON-APPOINTMENT (OUTPATIENT)
Age: 83
End: 2023-10-23

## 2023-10-28 NOTE — ASU DISCHARGE PLAN (ADULT/PEDIATRIC) - SIGNS AND SYMPTOMS OF INFECTION: FEVER, REDNESS, SWELLING, FOUL SMELLING DISCHARGE
You must understand that you've received an Urgent Care treatment only and that you may be released before all your medical problems are known or treated. You, the patient, will arrange for follow up care as instructed.      Follow up with your PCP or specialty clinic as instructed in the next 2-3 days if not improved or as needed. You can call [phone] to schedule an appointment with appropriate provider.      If you condition worsens, we recommend that you receive another evaluation at the emergency room immediately or contact your primary medical clinic's after hours call service to discuss your concerns.      Please return here or go to the Emergency Department for any concerns or worsening condition.      If you were prescribed a narcotic or controlled substance, do not drive or operate heavy equipment or machinery while taking these medications.     
Statement Selected

## 2023-11-18 NOTE — H&P PST ADULT - CARDIOVASCULAR
"History Of Present Illness:      This is a an 86-year-old female with a history of atrial fibrillation.  She has no complaints of palpitations, chest pain, or shortness of breath.  She is taking metoprolol for control of the atrial fibrillation and she has been on Eliquis for stroke risk reduction.  No other cardiac complaints at this time.      Review of Systems  Other review of systems negative     Last Recorded Vitals:      12/4/2020     9:19 AM 12/8/2021     9:31 AM 12/9/2021     5:29 PM 11/15/2022     2:01 PM 12/7/2022     9:23 AM 11/17/2023     2:35 PM   Vitals   Systolic 148 140 124 115 132 126   Diastolic 82 70 80 62 76 80   Heart Rate 76 59  101 103    Height (in) 1.74 m (5' 8.5\") 1.74 m (5' 8.5\")  1.74 m (5' 8.5\") 1.74 m (5' 8.5\") 1.702 m (5' 7\")   Weight (lb) 210 217  197 197 211   BMI 31.47 kg/m2 32.51 kg/m2  29.52 kg/m2 29.52 kg/m2 33.05 kg/m2   BSA (m2) 2.15 m2 2.18 m2  2.08 m2 2.08 m2 2.13 m2   Visit Report      Report          Allergies:  Penicillin and Warfarin    Outpatient Medications:  Current Outpatient Medications   Medication Instructions    acetaminophen-codeine (Tylenol w/ Codeine #3) 300-30 mg tablet 1 tablet, oral, Every 8 hours PRN    Eliquis 5 mg tablet Take one twice daily    furosemide (LASIX) 20 mg, oral, Daily    levothyroxine (SYNTHROID, LEVOXYL) 100 mcg, oral, Daily before breakfast    metoprolol tartrate (LOPRESSOR) 25 mg, oral, Every 12 hours    rosuvastatin (CRESTOR) 20 mg, oral, Daily RT    zoledronic acid (Zometa) 4 mg/5 mL injection intravenous       Physical Exam:    General Appearance:  Alert, oriented, no distress  Skin:  Warm and dry  Head and Neck:  No elevation of JVP, no carotid bruits  Cardiac Exam:  Rhythm is regular, S1 and S2 are normal, no murmur S3 or S4  Lungs:  Clear to auscultation  Extremities:  no edema  Neurologic:  No focal deficits  Psychiatric:  Appropriate mood and behavior         Cardiology Tests:  I have personally review the diagnostic cardiac " testing and my interpretation is as follows:    EKG: Atrial fibrillation, right bundle branch block, controlled ventricular response  Echocardiogram 2015: Ejection fraction 60%      Assessment/Plan   Problem List Items Addressed This Visit             ICD-10-CM    Permanent atrial fibrillation (CMS/[facility]) - Primary I48.21     1.  Permanent atrial fibrillation: The ventricular response is well controlled on metoprolol.  The patient will remain on Eliquis for stroke risk reduction.  She is not a candidate for rhythm control strategies.  Follow-up in 10 to 12 months.         Relevant Medications    metoprolol tartrate (Lopressor) 25 mg tablet       James C Ramicone, DO    details… detailed exam

## 2023-11-21 ENCOUNTER — OUTPATIENT (OUTPATIENT)
Dept: OUTPATIENT SERVICES | Facility: HOSPITAL | Age: 83
LOS: 1 days | End: 2023-11-21
Payer: MEDICARE

## 2023-11-21 ENCOUNTER — APPOINTMENT (OUTPATIENT)
Dept: RADIOLOGY | Facility: IMAGING CENTER | Age: 83
End: 2023-11-21
Payer: MEDICARE

## 2023-11-21 ENCOUNTER — APPOINTMENT (OUTPATIENT)
Dept: ULTRASOUND IMAGING | Facility: IMAGING CENTER | Age: 83
End: 2023-11-21
Payer: MEDICARE

## 2023-11-21 ENCOUNTER — RESULT REVIEW (OUTPATIENT)
Age: 83
End: 2023-11-21

## 2023-11-21 ENCOUNTER — APPOINTMENT (OUTPATIENT)
Dept: MAMMOGRAPHY | Facility: IMAGING CENTER | Age: 83
End: 2023-11-21
Payer: MEDICARE

## 2023-11-21 DIAGNOSIS — Z00.00 ENCOUNTER FOR GENERAL ADULT MEDICAL EXAMINATION WITHOUT ABNORMAL FINDINGS: ICD-10-CM

## 2023-11-21 DIAGNOSIS — Z98.890 OTHER SPECIFIED POSTPROCEDURAL STATES: Chronic | ICD-10-CM

## 2023-11-21 DIAGNOSIS — M20.12 HALLUX VALGUS (ACQUIRED), LEFT FOOT: Chronic | ICD-10-CM

## 2023-11-21 DIAGNOSIS — R92.2 INCONCLUSIVE MAMMOGRAM: ICD-10-CM

## 2023-11-21 DIAGNOSIS — N60.01 SOLITARY CYST OF RIGHT BREAST: ICD-10-CM

## 2023-11-21 DIAGNOSIS — M85.80 OTHER SPECIFIED DISORDERS OF BONE DENSITY AND STRUCTURE, UNSPECIFIED SITE: ICD-10-CM

## 2023-11-21 DIAGNOSIS — R52 PAIN, UNSPECIFIED: Chronic | ICD-10-CM

## 2023-11-21 PROCEDURE — 77067 SCR MAMMO BI INCL CAD: CPT | Mod: 26

## 2023-11-21 PROCEDURE — 77080 DXA BONE DENSITY AXIAL: CPT

## 2023-11-21 PROCEDURE — 77063 BREAST TOMOSYNTHESIS BI: CPT | Mod: 26

## 2023-11-21 PROCEDURE — 76641 ULTRASOUND BREAST COMPLETE: CPT | Mod: 26,50,GY

## 2023-11-21 PROCEDURE — 77063 BREAST TOMOSYNTHESIS BI: CPT

## 2023-11-21 PROCEDURE — 77080 DXA BONE DENSITY AXIAL: CPT | Mod: 26

## 2023-11-21 PROCEDURE — 77067 SCR MAMMO BI INCL CAD: CPT

## 2023-11-21 PROCEDURE — 76641 ULTRASOUND BREAST COMPLETE: CPT

## 2023-12-04 ENCOUNTER — APPOINTMENT (OUTPATIENT)
Dept: OBGYN | Facility: CLINIC | Age: 83
End: 2023-12-04
Payer: MEDICARE

## 2023-12-04 ENCOUNTER — NON-APPOINTMENT (OUTPATIENT)
Age: 83
End: 2023-12-04

## 2023-12-04 DIAGNOSIS — R87.610 ATYPICAL SQUAMOUS CELLS OF UNDETERMINED SIGNIFICANCE ON CYTOLOGIC SMEAR OF CERVIX (ASC-US): ICD-10-CM

## 2023-12-04 DIAGNOSIS — R87.810 ATYPICAL SQUAMOUS CELLS OF UNDETERMINED SIGNIFICANCE ON CYTOLOGIC SMEAR OF CERVIX (ASC-US): ICD-10-CM

## 2023-12-04 DIAGNOSIS — M85.80 OTHER SPECIFIED DISORDERS OF BONE DENSITY AND STRUCTURE, UNSPECIFIED SITE: ICD-10-CM

## 2023-12-04 PROCEDURE — 99213 OFFICE O/P EST LOW 20 MIN: CPT

## 2023-12-05 LAB
APPEARANCE: CLEAR
BACTERIA: NEGATIVE /HPF
BILIRUBIN URINE: NEGATIVE
BLOOD URINE: NEGATIVE
CAST: 0 /LPF
COLOR: YELLOW
EPITHELIAL CELLS: 0 /HPF
GLUCOSE QUALITATIVE U: NEGATIVE MG/DL
KETONES URINE: NEGATIVE MG/DL
LEUKOCYTE ESTERASE URINE: NEGATIVE
MICROSCOPIC-UA: NORMAL
NITRITE URINE: NEGATIVE
PH URINE: 6
PROTEIN URINE: NEGATIVE MG/DL
RED BLOOD CELLS URINE: 1 /HPF
SPECIFIC GRAVITY URINE: 1.02
URINE CYTOLOGY: NORMAL
UROBILINOGEN URINE: 0.2 MG/DL
WHITE BLOOD CELLS URINE: 0 /HPF

## 2023-12-06 LAB — BACTERIA UR CULT: NORMAL

## 2024-07-01 ENCOUNTER — APPOINTMENT (OUTPATIENT)
Dept: OBGYN | Facility: CLINIC | Age: 84
End: 2024-07-01
Payer: MEDICARE

## 2024-07-01 ENCOUNTER — NON-APPOINTMENT (OUTPATIENT)
Age: 84
End: 2024-07-01

## 2024-07-01 DIAGNOSIS — N95.2 POSTMENOPAUSAL ATROPHIC VAGINITIS: ICD-10-CM

## 2024-07-01 DIAGNOSIS — N81.9 FEMALE GENITAL PROLAPSE, UNSPECIFIED: ICD-10-CM

## 2024-07-01 DIAGNOSIS — N39.3 STRESS INCONTINENCE (FEMALE) (MALE): ICD-10-CM

## 2024-07-01 DIAGNOSIS — N32.81 OVERACTIVE BLADDER: ICD-10-CM

## 2024-07-01 DIAGNOSIS — R31.29 OTHER MICROSCOPIC HEMATURIA: ICD-10-CM

## 2024-07-01 LAB
BILIRUB UR QL STRIP: NORMAL
CLARITY UR: CLEAR
COLLECTION METHOD: NORMAL
GLUCOSE UR-MCNC: NORMAL
HCG UR QL: 0.2 EU/DL
HGB UR QL STRIP.AUTO: NORMAL
KETONES UR-MCNC: NORMAL
LEUKOCYTE ESTERASE UR QL STRIP: NORMAL
NITRITE UR QL STRIP: NORMAL
PH UR STRIP: 5.5
PROT UR STRIP-MCNC: NORMAL
SP GR UR STRIP: 1.02

## 2024-07-01 PROCEDURE — 99459 PELVIC EXAMINATION: CPT

## 2024-07-01 PROCEDURE — 51798 US URINE CAPACITY MEASURE: CPT

## 2024-07-01 PROCEDURE — 99214 OFFICE O/P EST MOD 30 MIN: CPT | Mod: 25

## 2024-07-01 PROCEDURE — 51784 ANAL/URINARY MUSCLE STUDY: CPT

## 2024-07-01 PROCEDURE — 51797 INTRAABDOMINAL PRESSURE TEST: CPT

## 2024-07-01 PROCEDURE — 51729 CYSTOMETROGRAM W/VP&UP: CPT

## 2024-07-02 LAB
APPEARANCE: CLEAR
BACTERIA: NEGATIVE /HPF
BILIRUBIN URINE: NEGATIVE
BLOOD URINE: NEGATIVE
CAST: 0 /LPF
COLOR: YELLOW
EPITHELIAL CELLS: 0 /HPF
GLUCOSE QUALITATIVE U: NEGATIVE MG/DL
KETONES URINE: NEGATIVE MG/DL
LEUKOCYTE ESTERASE URINE: NEGATIVE
MICROSCOPIC-UA: NORMAL
NITRITE URINE: NEGATIVE
PH URINE: 5.5
PROTEIN URINE: NEGATIVE MG/DL
RED BLOOD CELLS URINE: 1 /HPF
SPECIFIC GRAVITY URINE: 1.02
UROBILINOGEN URINE: 0.2 MG/DL
WHITE BLOOD CELLS URINE: 0 /HPF

## 2024-07-03 LAB — BACTERIA UR CULT: NORMAL

## 2024-07-16 ENCOUNTER — APPOINTMENT (OUTPATIENT)
Dept: OBGYN | Facility: CLINIC | Age: 84
End: 2024-07-16
Payer: MEDICARE

## 2024-07-16 VITALS
WEIGHT: 169 LBS | BODY MASS INDEX: 27.16 KG/M2 | SYSTOLIC BLOOD PRESSURE: 119 MMHG | HEART RATE: 65 BPM | HEIGHT: 66 IN | DIASTOLIC BLOOD PRESSURE: 74 MMHG

## 2024-07-16 DIAGNOSIS — Z01.419 ENCOUNTER FOR GYNECOLOGICAL EXAMINATION (GENERAL) (ROUTINE) W/OUT ABNORMAL FINDINGS: ICD-10-CM

## 2024-07-16 PROCEDURE — G0101: CPT

## 2024-07-21 LAB
HPV 16 E6+E7 MRNA CVX QL NAA+PROBE: NOT DETECTED
HPV18+45 E6+E7 MRNA CVX QL NAA+PROBE: NOT DETECTED

## 2024-07-23 RX ORDER — MELATONIN 3 MG
TABLET ORAL
Refills: 0 | Status: ACTIVE | COMMUNITY

## 2024-07-27 LAB
CYTOLOGY CVX/VAG DOC THIN PREP: ABNORMAL
HPV HIGH+LOW RISK DNA PNL CVX: DETECTED

## 2024-08-09 ENCOUNTER — OUTPATIENT (OUTPATIENT)
Dept: OUTPATIENT SERVICES | Facility: HOSPITAL | Age: 84
LOS: 1 days | End: 2024-08-09
Payer: MEDICARE

## 2024-08-09 ENCOUNTER — RESULT REVIEW (OUTPATIENT)
Age: 84
End: 2024-08-09

## 2024-08-09 ENCOUNTER — APPOINTMENT (OUTPATIENT)
Dept: ULTRASOUND IMAGING | Facility: IMAGING CENTER | Age: 84
End: 2024-08-09

## 2024-08-09 DIAGNOSIS — M20.12 HALLUX VALGUS (ACQUIRED), LEFT FOOT: Chronic | ICD-10-CM

## 2024-08-09 DIAGNOSIS — R52 PAIN, UNSPECIFIED: Chronic | ICD-10-CM

## 2024-08-09 DIAGNOSIS — N39.46 MIXED INCONTINENCE: ICD-10-CM

## 2024-08-09 DIAGNOSIS — Z98.890 OTHER SPECIFIED POSTPROCEDURAL STATES: Chronic | ICD-10-CM

## 2024-08-09 PROCEDURE — 76830 TRANSVAGINAL US NON-OB: CPT

## 2024-08-09 PROCEDURE — 76830 TRANSVAGINAL US NON-OB: CPT | Mod: 26

## 2024-08-09 PROCEDURE — 76856 US EXAM PELVIC COMPLETE: CPT | Mod: 26

## 2024-08-09 PROCEDURE — 76856 US EXAM PELVIC COMPLETE: CPT

## 2024-08-27 ENCOUNTER — NON-APPOINTMENT (OUTPATIENT)
Age: 84
End: 2024-08-27

## 2024-08-28 ENCOUNTER — APPOINTMENT (OUTPATIENT)
Dept: OBGYN | Facility: CLINIC | Age: 84
End: 2024-08-28
Payer: MEDICARE

## 2024-08-28 ENCOUNTER — NON-APPOINTMENT (OUTPATIENT)
Age: 84
End: 2024-08-28

## 2024-08-28 VITALS — HEART RATE: 54 BPM | HEIGHT: 66 IN | SYSTOLIC BLOOD PRESSURE: 124 MMHG | DIASTOLIC BLOOD PRESSURE: 78 MMHG

## 2024-08-28 LAB
BILIRUB UR QL STRIP: NORMAL
CLARITY UR: CLEAR
COLLECTION METHOD: NORMAL
GLUCOSE UR-MCNC: NORMAL
HGB UR QL STRIP.AUTO: NORMAL
KETONES UR-MCNC: NORMAL
LEUKOCYTE ESTERASE UR QL STRIP: NORMAL
NITRITE UR QL STRIP: NORMAL
PROT UR STRIP-MCNC: NORMAL

## 2024-08-28 PROCEDURE — 99214 OFFICE O/P EST MOD 30 MIN: CPT | Mod: 25

## 2024-08-28 PROCEDURE — 64566 NEUROELTRD STIM POST TIBIAL: CPT

## 2024-09-03 ENCOUNTER — OUTPATIENT (OUTPATIENT)
Dept: OUTPATIENT SERVICES | Facility: HOSPITAL | Age: 84
LOS: 1 days | End: 2024-09-03
Payer: MEDICARE

## 2024-09-03 ENCOUNTER — APPOINTMENT (OUTPATIENT)
Dept: ULTRASOUND IMAGING | Facility: IMAGING CENTER | Age: 84
End: 2024-09-03
Payer: MEDICARE

## 2024-09-03 DIAGNOSIS — M20.12 HALLUX VALGUS (ACQUIRED), LEFT FOOT: Chronic | ICD-10-CM

## 2024-09-03 DIAGNOSIS — Z98.890 OTHER SPECIFIED POSTPROCEDURAL STATES: Chronic | ICD-10-CM

## 2024-09-03 DIAGNOSIS — N39.46 MIXED INCONTINENCE: ICD-10-CM

## 2024-09-03 DIAGNOSIS — R52 PAIN, UNSPECIFIED: Chronic | ICD-10-CM

## 2024-09-03 PROCEDURE — 76770 US EXAM ABDO BACK WALL COMP: CPT

## 2024-09-03 PROCEDURE — 76770 US EXAM ABDO BACK WALL COMP: CPT | Mod: 26

## 2024-09-04 ENCOUNTER — NON-APPOINTMENT (OUTPATIENT)
Age: 84
End: 2024-09-04

## 2024-09-04 ENCOUNTER — APPOINTMENT (OUTPATIENT)
Dept: OBGYN | Facility: CLINIC | Age: 84
End: 2024-09-04
Payer: MEDICARE

## 2024-09-04 VITALS
SYSTOLIC BLOOD PRESSURE: 112 MMHG | HEART RATE: 66 BPM | WEIGHT: 169 LBS | BODY MASS INDEX: 27.16 KG/M2 | DIASTOLIC BLOOD PRESSURE: 73 MMHG | HEIGHT: 66 IN

## 2024-09-04 DIAGNOSIS — N39.46 MIXED INCONTINENCE: ICD-10-CM

## 2024-09-04 DIAGNOSIS — N32.81 OVERACTIVE BLADDER: ICD-10-CM

## 2024-09-04 DIAGNOSIS — N95.2 POSTMENOPAUSAL ATROPHIC VAGINITIS: ICD-10-CM

## 2024-09-04 DIAGNOSIS — N81.9 FEMALE GENITAL PROLAPSE, UNSPECIFIED: ICD-10-CM

## 2024-09-04 PROCEDURE — 64566 NEUROELTRD STIM POST TIBIAL: CPT

## 2024-09-04 PROCEDURE — 99214 OFFICE O/P EST MOD 30 MIN: CPT | Mod: 25

## 2024-09-04 PROCEDURE — 58100 BIOPSY OF UTERUS LINING: CPT

## 2024-09-05 ENCOUNTER — APPOINTMENT (OUTPATIENT)
Dept: OBGYN | Facility: CLINIC | Age: 84
End: 2024-09-05

## 2024-09-05 VITALS
SYSTOLIC BLOOD PRESSURE: 121 MMHG | HEIGHT: 66 IN | DIASTOLIC BLOOD PRESSURE: 74 MMHG | HEART RATE: 65 BPM | WEIGHT: 169 LBS | BODY MASS INDEX: 27.16 KG/M2

## 2024-09-05 DIAGNOSIS — R87.619 UNSPECIFIED ABNORMAL CYTOLOGICAL FINDINGS IN SPECIMENS FROM CERVIX UTERI: ICD-10-CM

## 2024-09-05 DIAGNOSIS — B97.7 PAPILLOMAVIRUS AS THE CAUSE OF DISEASES CLASSIFIED ELSEWHERE: ICD-10-CM

## 2024-09-05 PROCEDURE — 99212 OFFICE O/P EST SF 10 MIN: CPT | Mod: 25

## 2024-09-05 PROCEDURE — 57456 ENDOCERV CURETTAGE W/SCOPE: CPT

## 2024-09-05 NOTE — PROCEDURE
[Colposcopy] : Colposcopy  [Time out performed] : Pre-procedure time out performed.  Patient's name, date of birth and procedure confirmed. [Consent Obtained] : Consent obtained [Risks] : risks [Benefits] : benefits [Alternatives] : alternatives [Patient] : patient [Infection] : infection [Bleeding] : bleeding [Allergic Reaction] : allergic reaction [Pap Performed] : pap not performed [ECC Performed] : ECC performed [No Abnormalities] : no abnormalities [Hemostasis Obtained] : Hemostasis obtained [Tolerated Well] : the patient tolerated the procedure well [de-identified] : TZNFV

## 2024-09-05 NOTE — PHYSICAL EXAM
[Chaperone Present] : A chaperone was present in the examining room during all aspects of the physical examination [FreeTextEntry2] : brianna ritchie [Appropriately responsive] : appropriately responsive [Alert] : alert [No Acute Distress] : no acute distress [No Lymphadenopathy] : no lymphadenopathy [No Lesions] : no lesions  [Labia Majora] : normal [Labia Minora] : normal [Atrophy] : atrophy [Normal] : normal [Uterine Adnexae] : non-palpable [FreeTextEntry1] : No evidence LS

## 2024-09-05 NOTE — PROCEDURE
[Colposcopy] : Colposcopy  [Time out performed] : Pre-procedure time out performed.  Patient's name, date of birth and procedure confirmed. [Consent Obtained] : Consent obtained [Risks] : risks [Benefits] : benefits [Alternatives] : alternatives [Patient] : patient [Infection] : infection [Bleeding] : bleeding [Allergic Reaction] : allergic reaction [Pap Performed] : pap not performed [ECC Performed] : ECC performed [No Abnormalities] : no abnormalities [Hemostasis Obtained] : Hemostasis obtained [Tolerated Well] : the patient tolerated the procedure well [de-identified] : TZNFV

## 2024-09-05 NOTE — PLAN
[FreeTextEntry1] : Extensive review of above mentioned concerns. All questions and concerns addressed, patient expressed understanding. Encouraged to contact the office with any questions or concerns. F/U pending results

## 2024-09-09 ENCOUNTER — NON-APPOINTMENT (OUTPATIENT)
Age: 84
End: 2024-09-09

## 2024-09-09 DIAGNOSIS — N95.2 POSTMENOPAUSAL ATROPHIC VAGINITIS: ICD-10-CM

## 2024-09-09 LAB — CORE LAB BIOPSY: NORMAL

## 2024-09-09 RX ORDER — ESTRADIOL 0.1 MG/G
0.1 CREAM VAGINAL
Qty: 42.5 | Refills: 0 | Status: ACTIVE | COMMUNITY
Start: 2024-09-09 | End: 1900-01-01

## 2024-09-18 ENCOUNTER — NON-APPOINTMENT (OUTPATIENT)
Age: 84
End: 2024-09-18

## 2024-09-23 ENCOUNTER — NON-APPOINTMENT (OUTPATIENT)
Age: 84
End: 2024-09-23

## 2024-09-23 ENCOUNTER — APPOINTMENT (OUTPATIENT)
Dept: OBGYN | Facility: CLINIC | Age: 84
End: 2024-09-23

## 2024-09-23 VITALS
HEART RATE: 75 BPM | WEIGHT: 170 LBS | DIASTOLIC BLOOD PRESSURE: 76 MMHG | HEIGHT: 66 IN | SYSTOLIC BLOOD PRESSURE: 126 MMHG | BODY MASS INDEX: 27.32 KG/M2

## 2024-09-23 DIAGNOSIS — N32.81 OVERACTIVE BLADDER: ICD-10-CM

## 2024-09-23 DIAGNOSIS — N39.3 STRESS INCONTINENCE (FEMALE) (MALE): ICD-10-CM

## 2024-09-23 DIAGNOSIS — N95.2 POSTMENOPAUSAL ATROPHIC VAGINITIS: ICD-10-CM

## 2024-09-23 DIAGNOSIS — N81.9 FEMALE GENITAL PROLAPSE, UNSPECIFIED: ICD-10-CM

## 2024-09-23 PROCEDURE — 64566 NEUROELTRD STIM POST TIBIAL: CPT

## 2024-09-23 PROCEDURE — 99213 OFFICE O/P EST LOW 20 MIN: CPT | Mod: 25

## 2024-10-07 ENCOUNTER — RX ONLY (RX ONLY)
Age: 84
End: 2024-10-07

## 2024-10-07 ENCOUNTER — DOCTOR'S OFFICE (OUTPATIENT)
Facility: LOCATION | Age: 84
Setting detail: OPHTHALMOLOGY
End: 2024-10-07
Payer: MEDICARE

## 2024-10-07 DIAGNOSIS — H25.12: ICD-10-CM

## 2024-10-07 DIAGNOSIS — Z96.1: ICD-10-CM

## 2024-10-07 DIAGNOSIS — H11.153: ICD-10-CM

## 2024-10-07 PROBLEM — H35.371 EPIRETINAL MEMBRANE; RIGHT EYE: Status: ACTIVE | Noted: 2024-10-07

## 2024-10-07 PROCEDURE — 92014 COMPRE OPH EXAM EST PT 1/>: CPT | Performed by: OPHTHALMOLOGY

## 2024-10-07 ASSESSMENT — KERATOMETRY
OS_K2POWER_DIOPTERS: 45.00
OD_AXISANGLE_DEGREES: 015
METHOD_AUTO_MANUAL: AUTO
OD_K2POWER_DIOPTERS: 44.75
OD_K1POWER_DIOPTERS: 44.00
OS_AXISANGLE_DEGREES: 170
OS_K1POWER_DIOPTERS: 44.00

## 2024-10-07 ASSESSMENT — REFRACTION_MANIFEST
OS_SPHERE: +2.25
OD_AXIS: 120
OS_ADD: +2.25
OD_VA1: 20/20
OD_CYLINDER: +0.50
OS_VA1: 20/25
OD_AXIS: 140
OD_SPHERE: -0.50
OS_ADD: +2.75
OS_CYLINDER: +1.25
OD_SPHERE: -0.25
OS_CYLINDER: +1.00
OS_AXIS: 010
OD_ADD: +1.50
OD_ADD: +2.50
OS_SPHERE: +2.25
OS_AXIS: 005
OD_CYLINDER: +0.25

## 2024-10-07 ASSESSMENT — REFRACTION_AUTOREFRACTION
OS_SPHERE: +2.50
OD_AXIS: 140
OS_AXIS: 005
OS_CYLINDER: +1.25
OD_CYLINDER: +0.25
OD_SPHERE: -0.25

## 2024-10-07 ASSESSMENT — TONOMETRY
OD_IOP_MMHG: 15
OS_IOP_MMHG: 15

## 2024-10-07 ASSESSMENT — REFRACTION_CURRENTRX
OS_SPHERE: +1.25
OD_CYLINDER: +0.50
OS_OVR_VA: 20/
OS_VPRISM_DIRECTION: SV
OD_VPRISM_DIRECTION: SV
OS_CYLINDER: +1.00
OS_AXIS: 005
OD_OVR_VA: 20/
OD_SPHERE: +2.00
OD_AXIS: 118
OD_VPRISM_DIRECTION: SV
OS_SPHERE: +5.25
OD_SPHERE: +1.25
OS_VPRISM_DIRECTION: SV
OD_OVR_VA: 20/
OS_OVR_VA: 20/

## 2024-10-07 ASSESSMENT — CONFRONTATIONAL VISUAL FIELD TEST (CVF)
OS_COMMENTS: 20/150
OD_FINDINGS: FULL

## 2024-10-07 ASSESSMENT — LID EXAM ASSESSMENTS: OD_COMMENTS: TELANGIECTASIA

## 2024-10-07 ASSESSMENT — LID POSITION - PTOSIS: OD_PTOSIS: RUL

## 2024-10-07 ASSESSMENT — VISUAL ACUITY
OS_BCVA: 20/20
OD_BCVA: 20/150

## 2024-10-21 ENCOUNTER — APPOINTMENT (OUTPATIENT)
Dept: OBGYN | Facility: CLINIC | Age: 84
End: 2024-10-21
Payer: MEDICARE

## 2024-10-21 VITALS
HEIGHT: 66 IN | WEIGHT: 172 LBS | BODY MASS INDEX: 27.64 KG/M2 | SYSTOLIC BLOOD PRESSURE: 126 MMHG | HEART RATE: 78 BPM | DIASTOLIC BLOOD PRESSURE: 78 MMHG

## 2024-10-21 DIAGNOSIS — N39.3 STRESS INCONTINENCE (FEMALE) (MALE): ICD-10-CM

## 2024-10-21 DIAGNOSIS — N32.81 OVERACTIVE BLADDER: ICD-10-CM

## 2024-10-21 DIAGNOSIS — N95.2 POSTMENOPAUSAL ATROPHIC VAGINITIS: ICD-10-CM

## 2024-10-21 DIAGNOSIS — N81.9 FEMALE GENITAL PROLAPSE, UNSPECIFIED: ICD-10-CM

## 2024-10-21 PROCEDURE — 99459 PELVIC EXAMINATION: CPT

## 2024-10-21 PROCEDURE — 64566 NEUROELTRD STIM POST TIBIAL: CPT

## 2024-10-21 PROCEDURE — 99214 OFFICE O/P EST MOD 30 MIN: CPT | Mod: 25

## 2024-11-22 ENCOUNTER — OUTPATIENT (OUTPATIENT)
Dept: OUTPATIENT SERVICES | Facility: HOSPITAL | Age: 84
LOS: 1 days | End: 2024-11-22
Payer: MEDICARE

## 2024-11-22 ENCOUNTER — APPOINTMENT (OUTPATIENT)
Dept: ULTRASOUND IMAGING | Facility: IMAGING CENTER | Age: 84
End: 2024-11-22
Payer: MEDICARE

## 2024-11-22 ENCOUNTER — RESULT REVIEW (OUTPATIENT)
Age: 84
End: 2024-11-22

## 2024-11-22 ENCOUNTER — APPOINTMENT (OUTPATIENT)
Dept: MAMMOGRAPHY | Facility: IMAGING CENTER | Age: 84
End: 2024-11-22
Payer: MEDICARE

## 2024-11-22 DIAGNOSIS — N60.01 SOLITARY CYST OF RIGHT BREAST: ICD-10-CM

## 2024-11-22 DIAGNOSIS — Z98.890 OTHER SPECIFIED POSTPROCEDURAL STATES: Chronic | ICD-10-CM

## 2024-11-22 DIAGNOSIS — N64.9 DISORDER OF BREAST, UNSPECIFIED: ICD-10-CM

## 2024-11-22 DIAGNOSIS — R52 PAIN, UNSPECIFIED: Chronic | ICD-10-CM

## 2024-11-22 PROCEDURE — 76641 ULTRASOUND BREAST COMPLETE: CPT | Mod: 26,50,GA

## 2024-11-22 PROCEDURE — 77063 BREAST TOMOSYNTHESIS BI: CPT | Mod: 26

## 2024-11-22 PROCEDURE — 77063 BREAST TOMOSYNTHESIS BI: CPT

## 2024-11-22 PROCEDURE — 77067 SCR MAMMO BI INCL CAD: CPT | Mod: 26

## 2024-11-22 PROCEDURE — 77067 SCR MAMMO BI INCL CAD: CPT

## 2024-11-22 PROCEDURE — 76641 ULTRASOUND BREAST COMPLETE: CPT

## 2024-12-09 ENCOUNTER — NON-APPOINTMENT (OUTPATIENT)
Age: 84
End: 2024-12-09

## 2024-12-09 ENCOUNTER — APPOINTMENT (OUTPATIENT)
Dept: OBGYN | Facility: CLINIC | Age: 84
End: 2024-12-09
Payer: MEDICARE

## 2024-12-09 VITALS
HEART RATE: 79 BPM | SYSTOLIC BLOOD PRESSURE: 123 MMHG | HEIGHT: 66 IN | DIASTOLIC BLOOD PRESSURE: 78 MMHG | WEIGHT: 168 LBS | BODY MASS INDEX: 27 KG/M2

## 2024-12-09 DIAGNOSIS — N32.81 OVERACTIVE BLADDER: ICD-10-CM

## 2024-12-09 DIAGNOSIS — M85.80 OTHER SPECIFIED DISORDERS OF BONE DENSITY AND STRUCTURE, UNSPECIFIED SITE: ICD-10-CM

## 2024-12-09 DIAGNOSIS — R31.29 OTHER MICROSCOPIC HEMATURIA: ICD-10-CM

## 2024-12-09 DIAGNOSIS — N81.9 FEMALE GENITAL PROLAPSE, UNSPECIFIED: ICD-10-CM

## 2024-12-09 PROCEDURE — 99214 OFFICE O/P EST MOD 30 MIN: CPT | Mod: 25

## 2024-12-09 PROCEDURE — 64566 NEUROELTRD STIM POST TIBIAL: CPT

## 2025-01-06 ENCOUNTER — APPOINTMENT (OUTPATIENT)
Dept: OBGYN | Facility: CLINIC | Age: 85
End: 2025-01-06
Payer: MEDICARE

## 2025-01-06 ENCOUNTER — NON-APPOINTMENT (OUTPATIENT)
Age: 85
End: 2025-01-06

## 2025-01-06 VITALS
WEIGHT: 169 LBS | DIASTOLIC BLOOD PRESSURE: 71 MMHG | HEART RATE: 60 BPM | BODY MASS INDEX: 27.16 KG/M2 | SYSTOLIC BLOOD PRESSURE: 118 MMHG | HEIGHT: 66 IN

## 2025-01-06 DIAGNOSIS — N32.81 OVERACTIVE BLADDER: ICD-10-CM

## 2025-01-06 DIAGNOSIS — N95.2 POSTMENOPAUSAL ATROPHIC VAGINITIS: ICD-10-CM

## 2025-01-06 DIAGNOSIS — R87.810 ATYPICAL SQUAMOUS CELLS OF UNDETERMINED SIGNIFICANCE ON CYTOLOGIC SMEAR OF CERVIX (ASC-US): ICD-10-CM

## 2025-01-06 DIAGNOSIS — N81.9 FEMALE GENITAL PROLAPSE, UNSPECIFIED: ICD-10-CM

## 2025-01-06 DIAGNOSIS — R87.610 ATYPICAL SQUAMOUS CELLS OF UNDETERMINED SIGNIFICANCE ON CYTOLOGIC SMEAR OF CERVIX (ASC-US): ICD-10-CM

## 2025-01-06 LAB
BILIRUB UR QL STRIP: NORMAL
COLLECTION METHOD: NORMAL
GLUCOSE UR-MCNC: NORMAL
HCG UR QL: 0.2 EU/DL
HGB UR QL STRIP.AUTO: NORMAL
KETONES UR-MCNC: NORMAL
LEUKOCYTE ESTERASE UR QL STRIP: NORMAL
NITRITE UR QL STRIP: NORMAL
PH UR STRIP: 5.5
PROT UR STRIP-MCNC: NORMAL
SP GR UR STRIP: 1.02

## 2025-01-06 PROCEDURE — 64566 NEUROELTRD STIM POST TIBIAL: CPT

## 2025-01-06 PROCEDURE — 99214 OFFICE O/P EST MOD 30 MIN: CPT | Mod: 25

## 2025-02-03 ENCOUNTER — NON-APPOINTMENT (OUTPATIENT)
Age: 85
End: 2025-02-03

## 2025-02-10 ENCOUNTER — NON-APPOINTMENT (OUTPATIENT)
Age: 85
End: 2025-02-10

## 2025-02-10 ENCOUNTER — APPOINTMENT (OUTPATIENT)
Dept: OBGYN | Facility: CLINIC | Age: 85
End: 2025-02-10
Payer: MEDICARE

## 2025-02-10 VITALS
WEIGHT: 171 LBS | HEIGHT: 66 IN | HEART RATE: 68 BPM | SYSTOLIC BLOOD PRESSURE: 124 MMHG | BODY MASS INDEX: 27.48 KG/M2 | DIASTOLIC BLOOD PRESSURE: 76 MMHG

## 2025-02-10 DIAGNOSIS — M85.80 OTHER SPECIFIED DISORDERS OF BONE DENSITY AND STRUCTURE, UNSPECIFIED SITE: ICD-10-CM

## 2025-02-10 DIAGNOSIS — N39.46 MIXED INCONTINENCE: ICD-10-CM

## 2025-02-10 DIAGNOSIS — N81.9 FEMALE GENITAL PROLAPSE, UNSPECIFIED: ICD-10-CM

## 2025-02-10 DIAGNOSIS — N32.81 OVERACTIVE BLADDER: ICD-10-CM

## 2025-02-10 PROCEDURE — 99214 OFFICE O/P EST MOD 30 MIN: CPT | Mod: 25

## 2025-02-10 PROCEDURE — 64566 NEUROELTRD STIM POST TIBIAL: CPT

## 2025-03-03 ENCOUNTER — NON-APPOINTMENT (OUTPATIENT)
Age: 85
End: 2025-03-03

## 2025-03-03 ENCOUNTER — APPOINTMENT (OUTPATIENT)
Dept: OBGYN | Facility: CLINIC | Age: 85
End: 2025-03-03
Payer: MEDICARE

## 2025-03-03 DIAGNOSIS — N39.46 MIXED INCONTINENCE: ICD-10-CM

## 2025-03-03 DIAGNOSIS — N81.9 FEMALE GENITAL PROLAPSE, UNSPECIFIED: ICD-10-CM

## 2025-03-03 DIAGNOSIS — N32.81 OVERACTIVE BLADDER: ICD-10-CM

## 2025-03-03 DIAGNOSIS — N95.2 POSTMENOPAUSAL ATROPHIC VAGINITIS: ICD-10-CM

## 2025-03-03 LAB
BILIRUB UR QL STRIP: NORMAL
GLUCOSE UR-MCNC: NORMAL
HGB UR QL STRIP.AUTO: NORMAL
KETONES UR-MCNC: NORMAL
LEUKOCYTE ESTERASE UR QL STRIP: NORMAL
NITRITE UR QL STRIP: NORMAL
PROT UR STRIP-MCNC: NORMAL

## 2025-03-03 PROCEDURE — 99213 OFFICE O/P EST LOW 20 MIN: CPT | Mod: 25

## 2025-03-03 PROCEDURE — 64566 NEUROELTRD STIM POST TIBIAL: CPT

## 2025-03-17 ENCOUNTER — DOCTOR'S OFFICE (OUTPATIENT)
Facility: LOCATION | Age: 85
Setting detail: OPHTHALMOLOGY
End: 2025-03-17
Payer: MEDICARE

## 2025-03-17 DIAGNOSIS — H35.371: ICD-10-CM

## 2025-03-17 DIAGNOSIS — Z96.1: ICD-10-CM

## 2025-03-17 DIAGNOSIS — H02.401: ICD-10-CM

## 2025-03-17 DIAGNOSIS — H00.022: ICD-10-CM

## 2025-03-17 DIAGNOSIS — H25.12: ICD-10-CM

## 2025-03-17 DIAGNOSIS — H01.001: ICD-10-CM

## 2025-03-17 DIAGNOSIS — H11.153: ICD-10-CM

## 2025-03-17 DIAGNOSIS — H16.221: ICD-10-CM

## 2025-03-17 DIAGNOSIS — H01.004: ICD-10-CM

## 2025-03-17 DIAGNOSIS — H35.40: ICD-10-CM

## 2025-03-17 DIAGNOSIS — H00.025: ICD-10-CM

## 2025-03-17 PROCEDURE — 99214 OFFICE O/P EST MOD 30 MIN: CPT | Performed by: OPHTHALMOLOGY

## 2025-03-17 ASSESSMENT — REFRACTION_AUTOREFRACTION
OD_AXIS: 115
OS_AXIS: 180
OD_SPHERE: -0.50
OD_CYLINDER: +0.50
OS_SPHERE: +2.25
OS_CYLINDER: +1.25

## 2025-03-17 ASSESSMENT — DRY EYES - PHYSICIAN NOTES: OD_GENERALCOMMENTS: IN

## 2025-03-17 ASSESSMENT — REFRACTION_MANIFEST
OS_ADD: +2.25
OS_CYLINDER: +1.00
OS_AXIS: 180
OS_ADD: +2.75
OD_CYLINDER: +0.50
OS_AXIS: 005
OS_CYLINDER: +1.25
OD_SPHERE: -0.50
OS_AXIS: 010
OD_SPHERE: -0.25
OD_AXIS: 120
OS_SPHERE: +2.25
OD_ADD: +1.50
OD_VA1: 20/20
OS_VA1: 20/25
OS_SPHERE: +2.25
OD_CYLINDER: +0.25
OD_ADD: +2.50
OD_AXIS: 140
OS_CYLINDER: +1.25
OS_VA1: 20/20-
OS_SPHERE: +2.25

## 2025-03-17 ASSESSMENT — TONOMETRY
OD_IOP_MMHG: 12
OS_IOP_MMHG: 14

## 2025-03-17 ASSESSMENT — KERATOMETRY
OS_AXISANGLE_DEGREES: 171
OD_K1POWER_DIOPTERS: 44.25
METHOD_AUTO_MANUAL: AUTO
OS_K2POWER_DIOPTERS: 45.25
OD_K2POWER_DIOPTERS: 45.00
OS_K1POWER_DIOPTERS: 44.00
OD_AXISANGLE_DEGREES: 012

## 2025-03-17 ASSESSMENT — REFRACTION_CURRENTRX
OD_AXIS: 118
OS_SPHERE: +5.25
OD_CYLINDER: -0.50
OS_SPHERE: +3.50
OS_OVR_VA: 20/
OD_SPHERE: PLANO
OS_OVR_VA: 20/
OD_VPRISM_DIRECTION: SV
OS_CYLINDER: -1.25
OD_OVR_VA: 20/
OD_AXIS: 025
OS_AXIS: 005
OS_SPHERE: +1.25
OS_OVR_VA: 20/
OD_SPHERE: +2.00
OD_VPRISM_DIRECTION: SV
OD_OVR_VA: 20/
OS_CYLINDER: +1.00
OS_VPRISM_DIRECTION: SV
OD_SPHERE: +1.25
OD_CYLINDER: +0.50
OS_VPRISM_DIRECTION: SV
OS_AXIS: 090
OD_OVR_VA: 20/

## 2025-03-17 ASSESSMENT — CONFRONTATIONAL VISUAL FIELD TEST (CVF)
OD_FINDINGS: FULL
OS_FINDINGS: FULL

## 2025-03-17 ASSESSMENT — LID EXAM ASSESSMENTS
OS_BLEPHARITIS: LUL T 1+
OS_MEIBOMITIS: LLL
OD_MEIBOMITIS: RLL
OD_BLEPHARITIS: RUL T 1+

## 2025-03-17 ASSESSMENT — VISUAL ACUITY
OD_BCVA: 20/100-1+2
OS_BCVA: 20/20

## 2025-03-17 ASSESSMENT — LID POSITION - PTOSIS: OD_PTOSIS: RUL

## 2025-03-17 ASSESSMENT — SUPERFICIAL PUNCTATE KERATITIS (SPK): OD_SPK: 1+ 2+

## 2025-03-17 ASSESSMENT — CORNEAL DYSTROPHY - POSTERIOR: OS_POSTERIORDYSTROPHY: T GUTTATA

## 2025-04-14 ENCOUNTER — DOCTOR'S OFFICE (OUTPATIENT)
Facility: LOCATION | Age: 85
Setting detail: OPHTHALMOLOGY
End: 2025-04-14
Payer: MEDICARE

## 2025-04-14 DIAGNOSIS — H25.12: ICD-10-CM

## 2025-04-14 PROCEDURE — 92136 OPHTHALMIC BIOMETRY: CPT | Mod: 26,LT | Performed by: OPHTHALMOLOGY

## 2025-04-14 PROCEDURE — 99213 OFFICE O/P EST LOW 20 MIN: CPT | Performed by: OPHTHALMOLOGY

## 2025-04-14 ASSESSMENT — REFRACTION_CURRENTRX
OS_OVR_VA: 20/
OD_OVR_VA: 20/
OS_CYLINDER: -1.25
OS_SPHERE: +3.50
OS_AXIS: 005
OD_VPRISM_DIRECTION: SV
OD_SPHERE: PLANO
OD_SPHERE: +2.00
OS_VPRISM_DIRECTION: SV
OS_AXIS: 090
OD_CYLINDER: +0.50
OD_VPRISM_DIRECTION: SV
OS_SPHERE: +5.25
OD_OVR_VA: 20/
OS_CYLINDER: +1.00
OD_AXIS: 118
OD_CYLINDER: -0.50
OD_AXIS: 025
OS_SPHERE: +1.25
OS_OVR_VA: 20/
OD_OVR_VA: 20/
OS_VPRISM_DIRECTION: SV
OD_SPHERE: +1.25
OS_OVR_VA: 20/

## 2025-04-14 ASSESSMENT — REFRACTION_MANIFEST
OS_VA1: 20/20-
OD_AXIS: 140
OD_SPHERE: PLANO
OD_VA1: 20/20
OS_CYLINDER: +1.00
OS_CYLINDER: +1.25
OS_ADD: +2.25
OD_SPHERE: -0.50
OD_ADD: +2.50
OS_AXIS: 005
OD_VA1: 20/20
OD_AXIS: 120
OS_AXIS: 180
OD_ADD: +1.50
OD_CYLINDER: +0.50
OS_VA1: 20/25
OS_SPHERE: +2.25
OS_SPHERE: +2.25
OD_AXIS: 120
OD_CYLINDER: +0.50
OS_SPHERE: +2.25
OS_CYLINDER: +1.25
OS_AXIS: 010
OS_ADD: +2.75
OD_CYLINDER: +0.25
OD_SPHERE: -0.25

## 2025-04-14 ASSESSMENT — KERATOMETRY
OD_K1POWER_DIOPTERS: 43.50
OD_K2POWER_DIOPTERS: 45.00
OS_AXISANGLE_DEGREES: 082
OS_K1POWER_DIOPTERS: 40.50
METHOD_AUTO_MANUAL: AUTO
OD_AXISANGLE_DEGREES: 012
OS_K2POWER_DIOPTERS: 41.50

## 2025-04-14 ASSESSMENT — REFRACTION_AUTOREFRACTION
OD_SPHERE: -0.25
OS_CYLINDER: +1.50
OS_AXIS: 179
OD_AXIS: 161
OD_CYLINDER: +0.25
OS_SPHERE: +2.25

## 2025-04-14 ASSESSMENT — VISUAL ACUITY
OS_BCVA: 20/20
OD_BCVA: 20/70 -1

## 2025-04-14 ASSESSMENT — CONFRONTATIONAL VISUAL FIELD TEST (CVF)
OD_FINDINGS: FULL
OS_FINDINGS: FULL

## 2025-04-14 ASSESSMENT — CORNEAL DYSTROPHY - POSTERIOR: OS_POSTERIORDYSTROPHY: T GUTTATA

## 2025-04-21 ENCOUNTER — APPOINTMENT (OUTPATIENT)
Dept: OBGYN | Facility: CLINIC | Age: 85
End: 2025-04-21
Payer: MEDICARE

## 2025-04-21 VITALS
WEIGHT: 169 LBS | BODY MASS INDEX: 27.16 KG/M2 | HEIGHT: 66 IN | DIASTOLIC BLOOD PRESSURE: 84 MMHG | SYSTOLIC BLOOD PRESSURE: 124 MMHG

## 2025-04-21 DIAGNOSIS — N39.3 STRESS INCONTINENCE (FEMALE) (MALE): ICD-10-CM

## 2025-04-21 DIAGNOSIS — N81.9 FEMALE GENITAL PROLAPSE, UNSPECIFIED: ICD-10-CM

## 2025-04-21 DIAGNOSIS — N32.81 OVERACTIVE BLADDER: ICD-10-CM

## 2025-04-21 DIAGNOSIS — N95.2 POSTMENOPAUSAL ATROPHIC VAGINITIS: ICD-10-CM

## 2025-04-21 PROCEDURE — 64566 NEUROELTRD STIM POST TIBIAL: CPT

## 2025-04-21 PROCEDURE — 99213 OFFICE O/P EST LOW 20 MIN: CPT | Mod: 25

## 2025-04-24 ENCOUNTER — ASC (OUTPATIENT)
Dept: URBAN - METROPOLITAN AREA SURGERY 8 | Facility: SURGERY | Age: 85
Setting detail: OPHTHALMOLOGY
End: 2025-04-24
Payer: MEDICARE

## 2025-04-24 DIAGNOSIS — H52.222: ICD-10-CM

## 2025-04-24 DIAGNOSIS — H25.11: ICD-10-CM

## 2025-04-24 DIAGNOSIS — H57.03: ICD-10-CM

## 2025-04-24 PROCEDURE — 66982 XCAPSL CTRC RMVL CPLX WO ECP: CPT | Mod: LT | Performed by: OPHTHALMOLOGY

## 2025-04-24 PROCEDURE — S9986 NOT MEDICALLY NECESSARY SVC: HCPCS | Mod: GX,GY | Performed by: OPHTHALMOLOGY

## 2025-04-24 PROCEDURE — FEMTO PRECISION LASER CATARACT SURGERY: Mod: GY | Performed by: OPHTHALMOLOGY

## 2025-04-24 PROCEDURE — V2787 ASTIGMATISM-CORRECT FUNCTION: HCPCS | Performed by: OPHTHALMOLOGY

## 2025-04-25 ENCOUNTER — RX ONLY (RX ONLY)
Age: 85
End: 2025-04-25

## 2025-04-25 ENCOUNTER — DOCTOR'S OFFICE (OUTPATIENT)
Facility: LOCATION | Age: 85
Setting detail: OPHTHALMOLOGY
End: 2025-04-25
Payer: MEDICARE

## 2025-04-25 DIAGNOSIS — Z96.1: ICD-10-CM

## 2025-04-25 PROCEDURE — 99024 POSTOP FOLLOW-UP VISIT: CPT | Performed by: OPHTHALMOLOGY

## 2025-04-25 ASSESSMENT — REFRACTION_CURRENTRX
OD_AXIS: 118
OD_SPHERE: +2.00
OD_VPRISM_DIRECTION: SV
OS_CYLINDER: -1.25
OD_SPHERE: PLANO
OD_OVR_VA: 20/
OS_SPHERE: +3.50
OS_CYLINDER: +1.00
OD_CYLINDER: +0.50
OS_OVR_VA: 20/
OD_OVR_VA: 20/
OD_CYLINDER: -0.50
OS_SPHERE: +1.25
OS_AXIS: 090
OS_OVR_VA: 20/
OD_VPRISM_DIRECTION: SV
OS_AXIS: 005
OD_AXIS: 025
OS_VPRISM_DIRECTION: SV
OS_OVR_VA: 20/
OS_SPHERE: +5.25
OD_OVR_VA: 20/
OD_SPHERE: +1.25
OS_VPRISM_DIRECTION: SV

## 2025-04-25 ASSESSMENT — CORNEAL DYSTROPHY - POSTERIOR: OS_POSTERIORDYSTROPHY: 1+ GUTTATA

## 2025-04-25 ASSESSMENT — REFRACTION_AUTOREFRACTION
OS_CYLINDER: +1.00
OS_SPHERE: -1.00
OD_SPHERE: -0.75
OD_AXIS: 119
OD_CYLINDER: +0.75
OS_AXIS: 075

## 2025-04-25 ASSESSMENT — REFRACTION_MANIFEST
OS_SPHERE: +2.25
OD_AXIS: 120
OS_ADD: +2.25
OD_AXIS: 140
OD_AXIS: 120
OD_VA1: 20/20
OD_SPHERE: -0.50
OD_ADD: +2.50
OS_AXIS: 010
OS_VA1: 20/20-
OS_SPHERE: +2.25
OD_CYLINDER: +0.25
OS_CYLINDER: +1.00
OS_SPHERE: +2.25
OS_VA1: 20/25
OS_AXIS: 180
OD_CYLINDER: +0.50
OS_ADD: +2.75
OD_SPHERE: -0.25
OD_SPHERE: PLANO
OD_ADD: +1.50
OD_CYLINDER: +0.50
OS_CYLINDER: +1.25
OS_AXIS: 005
OS_CYLINDER: +1.25
OD_VA1: 20/20

## 2025-04-25 ASSESSMENT — KERATOMETRY
OD_AXISANGLE_DEGREES: 090
OD_K2POWER_DIOPTERS: 45.00
OS_AXISANGLE_DEGREES: 166
OS_K2POWER_DIOPTERS: 45.25
OS_K1POWER_DIOPTERS: 44.25
OD_K1POWER_DIOPTERS: 45.00
METHOD_AUTO_MANUAL: AUTO

## 2025-04-25 ASSESSMENT — LID EXAM ASSESSMENTS
OS_BLEPHARITIS: LUL T 1+
OD_MEIBOMITIS: RLL
OS_MEIBOMITIS: LLL
OD_BLEPHARITIS: RUL T 1+

## 2025-04-25 ASSESSMENT — CORNEAL DYSTROPHY: OS_DYSTROPHY: 1+ MAPS

## 2025-04-25 ASSESSMENT — SUPERFICIAL PUNCTATE KERATITIS (SPK): OS_SPK: 1+

## 2025-04-25 ASSESSMENT — CORNEAL EDEMA - FOLDS/STRIAE: OS_FOLDSSTRIAE: 1+ 2+

## 2025-04-25 ASSESSMENT — VISUAL ACUITY
OD_BCVA: 20/50
OS_BCVA: 20/20

## 2025-04-25 ASSESSMENT — LID POSITION - PTOSIS: OD_PTOSIS: RUL

## 2025-04-25 ASSESSMENT — CONFRONTATIONAL VISUAL FIELD TEST (CVF)
OD_FINDINGS: FULL
OS_FINDINGS: FULL

## 2025-04-25 ASSESSMENT — TONOMETRY: OS_IOP_MMHG: 14

## 2025-04-25 ASSESSMENT — DRY EYES - PHYSICIAN NOTES: OS_GENERALCOMMENTS: SPK INFERIORLY

## 2025-04-30 ENCOUNTER — DOCTOR'S OFFICE (OUTPATIENT)
Facility: LOCATION | Age: 85
Setting detail: OPHTHALMOLOGY
End: 2025-04-30
Payer: MEDICARE

## 2025-04-30 DIAGNOSIS — Z96.1: ICD-10-CM

## 2025-04-30 PROBLEM — H11.152 PINGUECULA ;  , LEFT EYE: Status: ACTIVE | Noted: 2025-04-25

## 2025-04-30 PROBLEM — H16.222 DRY EYE SYNDROME K SICCA;  , LEFT EYE: Status: ACTIVE | Noted: 2025-04-25

## 2025-04-30 PROCEDURE — 99024 POSTOP FOLLOW-UP VISIT: CPT | Performed by: OPHTHALMOLOGY

## 2025-04-30 ASSESSMENT — CONFRONTATIONAL VISUAL FIELD TEST (CVF)
OS_FINDINGS: FULL
OD_FINDINGS: FULL

## 2025-04-30 ASSESSMENT — REFRACTION_MANIFEST
OD_CYLINDER: +0.50
OD_AXIS: 120
OS_ADD: +2.25
OS_AXIS: 010
OS_CYLINDER: +1.25
OD_CYLINDER: +0.50
OD_SPHERE: -0.50
OD_SPHERE: -0.25
OD_ADD: +1.50
OS_SPHERE: +2.25
OS_SPHERE: +2.25
OS_ADD: +2.75
OS_VA1: 20/20-
OD_SPHERE: PLANO
OD_AXIS: 140
OS_CYLINDER: +1.00
OD_VA1: 20/20
OS_AXIS: 180
OS_AXIS: 005
OD_VA1: 20/20
OD_ADD: +2.50
OS_VA1: 20/25
OD_AXIS: 120
OD_CYLINDER: +0.25
OS_SPHERE: +2.25
OS_CYLINDER: +1.25

## 2025-04-30 ASSESSMENT — VISUAL ACUITY
OS_BCVA: 20/20
OD_BCVA: 20/30-2+1

## 2025-04-30 ASSESSMENT — REFRACTION_CURRENTRX
OD_OVR_VA: 20/
OD_SPHERE: PLANO
OS_SPHERE: +1.25
OS_OVR_VA: 20/
OS_VPRISM_DIRECTION: SV
OD_AXIS: 025
OD_CYLINDER: -0.50
OS_VPRISM_DIRECTION: SV
OS_AXIS: 005
OD_AXIS: 118
OS_SPHERE: +3.50
OD_SPHERE: +1.25
OS_CYLINDER: +1.00
OS_OVR_VA: 20/
OD_SPHERE: +2.00
OS_OVR_VA: 20/
OD_OVR_VA: 20/
OD_VPRISM_DIRECTION: SV
OD_CYLINDER: +0.50
OD_OVR_VA: 20/
OS_AXIS: 090
OD_VPRISM_DIRECTION: SV
OS_SPHERE: +5.25
OS_CYLINDER: -1.25

## 2025-04-30 ASSESSMENT — TONOMETRY: OS_IOP_MMHG: 12

## 2025-05-05 ENCOUNTER — APPOINTMENT (OUTPATIENT)
Dept: OBGYN | Facility: CLINIC | Age: 85
End: 2025-05-05
Payer: MEDICARE

## 2025-05-05 ENCOUNTER — NON-APPOINTMENT (OUTPATIENT)
Age: 85
End: 2025-05-05

## 2025-05-05 VITALS
DIASTOLIC BLOOD PRESSURE: 77 MMHG | SYSTOLIC BLOOD PRESSURE: 130 MMHG | BODY MASS INDEX: 27.16 KG/M2 | WEIGHT: 169 LBS | HEIGHT: 66 IN | HEART RATE: 65 BPM

## 2025-05-05 DIAGNOSIS — M85.80 OTHER SPECIFIED DISORDERS OF BONE DENSITY AND STRUCTURE, UNSPECIFIED SITE: ICD-10-CM

## 2025-05-05 DIAGNOSIS — N81.9 FEMALE GENITAL PROLAPSE, UNSPECIFIED: ICD-10-CM

## 2025-05-05 DIAGNOSIS — N32.81 OVERACTIVE BLADDER: ICD-10-CM

## 2025-05-05 DIAGNOSIS — N95.2 POSTMENOPAUSAL ATROPHIC VAGINITIS: ICD-10-CM

## 2025-05-05 PROCEDURE — 64566 NEUROELTRD STIM POST TIBIAL: CPT

## 2025-05-05 PROCEDURE — 99213 OFFICE O/P EST LOW 20 MIN: CPT | Mod: 25

## 2025-05-19 ENCOUNTER — DOCTOR'S OFFICE (OUTPATIENT)
Facility: LOCATION | Age: 85
Setting detail: OPHTHALMOLOGY
End: 2025-05-19
Payer: MEDICARE

## 2025-05-19 DIAGNOSIS — Z96.1: ICD-10-CM

## 2025-05-19 PROBLEM — H11.153 PINGUECULA ;  ,, BOTH EYES: Status: ACTIVE | Noted: 2025-05-19

## 2025-05-19 PROCEDURE — 99024 POSTOP FOLLOW-UP VISIT: CPT | Performed by: OPHTHALMOLOGY

## 2025-05-19 ASSESSMENT — VISUAL ACUITY
OD_BCVA: 20/20-2
OS_BCVA: 20/20

## 2025-05-19 ASSESSMENT — REFRACTION_CURRENTRX
OD_SPHERE: +2.00
OS_AXIS: 090
OD_AXIS: 025
OD_SPHERE: +1.25
OS_SPHERE: +3.50
OD_CYLINDER: -0.50
OS_CYLINDER: -1.25
OD_OVR_VA: 20/
OS_OVR_VA: 20/
OS_VPRISM_DIRECTION: SV
OS_SPHERE: +1.25
OS_SPHERE: +5.25
OS_CYLINDER: +1.00
OD_CYLINDER: +0.50
OS_OVR_VA: 20/
OD_VPRISM_DIRECTION: SV
OD_SPHERE: PLANO
OS_AXIS: 005
OS_OVR_VA: 20/
OD_OVR_VA: 20/
OD_VPRISM_DIRECTION: SV
OS_VPRISM_DIRECTION: SV
OD_OVR_VA: 20/
OD_AXIS: 118

## 2025-05-19 ASSESSMENT — KERATOMETRY
OS_AXISANGLE_DEGREES: 169
OD_AXISANGLE_DEGREES: 019
OD_K2POWER_DIOPTERS: 45.00
OS_K1POWER_DIOPTERS: 44.00
OD_K1POWER_DIOPTERS: 44.00
OS_K2POWER_DIOPTERS: 45.25
METHOD_AUTO_MANUAL: AUTO

## 2025-05-19 ASSESSMENT — REFRACTION_AUTOREFRACTION
OS_SPHERE: -0.50
OD_CYLINDER: +0.50
OD_AXIS: 145
OD_SPHERE: -0.25
OS_CYLINDER: +0.50
OS_AXIS: 063

## 2025-05-19 ASSESSMENT — LID POSITION - PTOSIS: OD_PTOSIS: RUL

## 2025-05-19 ASSESSMENT — CONFRONTATIONAL VISUAL FIELD TEST (CVF)
OD_FINDINGS: FULL
OS_FINDINGS: FULL

## 2025-05-19 ASSESSMENT — REFRACTION_MANIFEST
OD_SPHERE: -0.50
OD_SPHERE: PLANO
OS_AXIS: 010
OD_CYLINDER: +0.25
OD_VA1: 20/20
OS_AXIS: 180
OS_CYLINDER: +1.00
OD_VA1: 20/20
OD_CYLINDER: +0.50
OS_SPHERE: +2.25
OS_AXIS: 005
OD_ADD: +2.50
OS_ADD: +2.75
OS_VA1: 20/20-
OD_SPHERE: -0.25
OD_AXIS: 120
OS_SPHERE: +2.25
OS_CYLINDER: +1.25
OS_CYLINDER: +1.25
OS_SPHERE: +2.25
OS_VA1: 20/25
OD_ADD: +1.50
OD_AXIS: 140
OS_ADD: +2.25
OD_AXIS: 120
OD_CYLINDER: +0.50

## 2025-05-19 ASSESSMENT — LID EXAM ASSESSMENTS
OD_MEIBOMITIS: RLL
OS_MEIBOMITIS: LLL
OS_BLEPHARITIS: LUL T 1+
OD_BLEPHARITIS: RUL T 1+

## 2025-05-19 ASSESSMENT — CORNEAL DYSTROPHY: OS_DYSTROPHY: 1+ MAPS

## 2025-05-19 ASSESSMENT — CORNEAL EDEMA - FOLDS/STRIAE: OS_FOLDSSTRIAE: 1+ 2+

## 2025-05-19 ASSESSMENT — DRY EYES - PHYSICIAN NOTES: OS_GENERALCOMMENTS: SPK INFERIORLY

## 2025-05-19 ASSESSMENT — SUPERFICIAL PUNCTATE KERATITIS (SPK): OS_SPK: 1+

## 2025-05-19 ASSESSMENT — CORNEAL DYSTROPHY - POSTERIOR: OS_POSTERIORDYSTROPHY: 1+ GUTTATA

## 2025-06-02 ENCOUNTER — APPOINTMENT (OUTPATIENT)
Dept: OBGYN | Facility: CLINIC | Age: 85
End: 2025-06-02
Payer: MEDICARE

## 2025-06-02 ENCOUNTER — NON-APPOINTMENT (OUTPATIENT)
Age: 85
End: 2025-06-02

## 2025-06-02 VITALS
HEART RATE: 64 BPM | SYSTOLIC BLOOD PRESSURE: 130 MMHG | WEIGHT: 169 LBS | BODY MASS INDEX: 27.16 KG/M2 | HEIGHT: 66 IN | DIASTOLIC BLOOD PRESSURE: 77 MMHG

## 2025-06-02 VITALS — DIASTOLIC BLOOD PRESSURE: 78 MMHG | SYSTOLIC BLOOD PRESSURE: 122 MMHG

## 2025-06-02 DIAGNOSIS — N81.9 FEMALE GENITAL PROLAPSE, UNSPECIFIED: ICD-10-CM

## 2025-06-02 DIAGNOSIS — M85.80 OTHER SPECIFIED DISORDERS OF BONE DENSITY AND STRUCTURE, UNSPECIFIED SITE: ICD-10-CM

## 2025-06-02 DIAGNOSIS — N32.81 OVERACTIVE BLADDER: ICD-10-CM

## 2025-06-02 DIAGNOSIS — N39.3 STRESS INCONTINENCE (FEMALE) (MALE): ICD-10-CM

## 2025-06-02 DIAGNOSIS — N95.2 POSTMENOPAUSAL ATROPHIC VAGINITIS: ICD-10-CM

## 2025-06-02 PROCEDURE — 99213 OFFICE O/P EST LOW 20 MIN: CPT | Mod: 25

## 2025-06-02 PROCEDURE — 64566 NEUROELTRD STIM POST TIBIAL: CPT

## 2025-06-04 ENCOUNTER — DOCTOR'S OFFICE (OUTPATIENT)
Facility: LOCATION | Age: 85
Setting detail: OPHTHALMOLOGY
End: 2025-06-04
Payer: MEDICARE

## 2025-06-04 DIAGNOSIS — Z96.1: ICD-10-CM

## 2025-06-04 PROCEDURE — 99024 POSTOP FOLLOW-UP VISIT: CPT | Performed by: OPHTHALMOLOGY

## 2025-06-04 ASSESSMENT — SUPERFICIAL PUNCTATE KERATITIS (SPK): OS_SPK: 1+

## 2025-06-04 ASSESSMENT — REFRACTION_MANIFEST
OD_AXIS: 120
OS_AXIS: 010
OS_SPHERE: +2.25
OS_ADD: +2.25
OD_CYLINDER: +0.50
OD_ADD: +2.50
OD_SPHERE: PLANO
OD_SPHERE: -0.25
OS_SPHERE: -0.25
OS_AXIS: 180
OS_CYLINDER: +0.25
OD_VA1: 20/20
OD_SPHERE: -0.50
OD_VA1: 20/25
OD_CYLINDER: +0.50
OS_AXIS: 060
OS_SPHERE: +2.25
OD_AXIS: 120
OS_VA1: 20/25+
OS_CYLINDER: +1.00
OS_CYLINDER: +1.25
OS_VA1: 20/20-
OD_CYLINDER: SPHERE

## 2025-06-04 ASSESSMENT — VISUAL ACUITY
OD_BCVA: 20/20-2
OS_BCVA: 20/25

## 2025-06-04 ASSESSMENT — REFRACTION_CURRENTRX
OD_VPRISM_DIRECTION: SV
OS_SPHERE: +3.50
OS_CYLINDER: +1.00
OS_OVR_VA: 20/
OS_SPHERE: +5.25
OD_VPRISM_DIRECTION: SV
OD_OVR_VA: 20/
OD_AXIS: 025
OD_OVR_VA: 20/
OD_SPHERE: PLANO
OS_OVR_VA: 20/
OS_AXIS: 090
OS_AXIS: 005
OD_OVR_VA: 20/
OD_AXIS: 118
OD_SPHERE: +2.00
OS_VPRISM_DIRECTION: SV
OS_SPHERE: +1.25
OD_SPHERE: +1.25
OD_CYLINDER: -0.50
OS_OVR_VA: 20/
OS_VPRISM_DIRECTION: SV
OD_CYLINDER: +0.50
OS_CYLINDER: -1.25

## 2025-06-04 ASSESSMENT — LID EXAM ASSESSMENTS
OD_MEIBOMITIS: RLL
OS_BLEPHARITIS: LUL T 1+
OD_BLEPHARITIS: RUL T 1+
OS_MEIBOMITIS: LLL

## 2025-06-04 ASSESSMENT — REFRACTION_AUTOREFRACTION
OD_AXIS: 128
OD_SPHERE: -0.25
OS_SPHERE: -0.50
OD_CYLINDER: +0.25
OS_AXIS: 040
OS_CYLINDER: +0.50

## 2025-06-04 ASSESSMENT — KERATOMETRY
OS_AXISANGLE_DEGREES: 169
METHOD_AUTO_MANUAL: AUTO
OD_K1POWER_DIOPTERS: 44.00
OD_AXISANGLE_DEGREES: 019
OD_K2POWER_DIOPTERS: 45.00
OS_K2POWER_DIOPTERS: 45.25
OS_K1POWER_DIOPTERS: 44.00

## 2025-06-04 ASSESSMENT — CORNEAL EDEMA - FOLDS/STRIAE: OS_FOLDSSTRIAE: 1+ 2+

## 2025-06-04 ASSESSMENT — CORNEAL DYSTROPHY: OS_DYSTROPHY: 1+ MAPS

## 2025-06-04 ASSESSMENT — CONFRONTATIONAL VISUAL FIELD TEST (CVF)
OD_FINDINGS: FULL
OS_FINDINGS: FULL

## 2025-06-04 ASSESSMENT — CORNEAL DYSTROPHY - POSTERIOR: OS_POSTERIORDYSTROPHY: 1+ GUTTATA

## 2025-06-04 ASSESSMENT — DRY EYES - PHYSICIAN NOTES: OS_GENERALCOMMENTS: SPK INFERIORLY

## 2025-06-04 ASSESSMENT — LID POSITION - PTOSIS: OD_PTOSIS: RUL

## 2025-06-25 ENCOUNTER — APPOINTMENT (OUTPATIENT)
Dept: PODIATRY | Facility: CLINIC | Age: 85
End: 2025-06-25
Payer: MEDICARE

## 2025-06-25 PROBLEM — B35.1 ONYCHOMYCOSIS: Status: ACTIVE | Noted: 2025-06-25

## 2025-06-25 PROBLEM — M79.674 PAIN IN TOES OF BOTH FEET: Status: ACTIVE | Noted: 2025-06-25

## 2025-06-25 PROCEDURE — 11720 DEBRIDE NAIL 1-5: CPT

## 2025-06-25 PROCEDURE — 99203 OFFICE O/P NEW LOW 30 MIN: CPT | Mod: 25

## 2025-06-28 ENCOUNTER — NON-APPOINTMENT (OUTPATIENT)
Age: 85
End: 2025-06-28

## 2025-07-07 ENCOUNTER — APPOINTMENT (OUTPATIENT)
Dept: OBGYN | Facility: CLINIC | Age: 85
End: 2025-07-07
Payer: MEDICARE

## 2025-07-07 VITALS
WEIGHT: 169 LBS | BODY MASS INDEX: 27.16 KG/M2 | DIASTOLIC BLOOD PRESSURE: 68 MMHG | HEIGHT: 66 IN | HEART RATE: 55 BPM | SYSTOLIC BLOOD PRESSURE: 114 MMHG

## 2025-07-07 LAB
BILIRUB UR QL STRIP: NORMAL
COLLECTION METHOD: NORMAL
GLUCOSE UR-MCNC: NORMAL
HCG UR QL: 0.2 EU/DL
HGB UR QL STRIP.AUTO: NORMAL
KETONES UR-MCNC: NORMAL
LEUKOCYTE ESTERASE UR QL STRIP: NORMAL
NITRITE UR QL STRIP: NORMAL
PH UR STRIP: 5
PROT UR STRIP-MCNC: NORMAL
SP GR UR STRIP: 1.01

## 2025-07-07 PROCEDURE — 99213 OFFICE O/P EST LOW 20 MIN: CPT | Mod: 25

## 2025-07-07 PROCEDURE — 64566 NEUROELTRD STIM POST TIBIAL: CPT

## 2025-07-08 ENCOUNTER — APPOINTMENT (OUTPATIENT)
Dept: OBGYN | Facility: CLINIC | Age: 85
End: 2025-07-08
Payer: MEDICARE

## 2025-07-08 VITALS
BODY MASS INDEX: 27.16 KG/M2 | HEIGHT: 66 IN | SYSTOLIC BLOOD PRESSURE: 118 MMHG | HEART RATE: 62 BPM | DIASTOLIC BLOOD PRESSURE: 75 MMHG | WEIGHT: 169 LBS

## 2025-07-08 PROBLEM — Z86.19 HISTORY OF INFECTION DUE TO HUMAN PAPILLOMA VIRUS (HPV): Status: RESOLVED | Noted: 2021-07-01 | Resolved: 2025-07-08

## 2025-07-08 PROBLEM — R87.610 ASCUS WITH POSITIVE HIGH RISK HPV CERVICAL: Status: RESOLVED | Noted: 2017-05-18 | Resolved: 2025-07-08

## 2025-07-08 PROBLEM — B97.7 HIGH RISK HPV INFECTION: Status: ACTIVE | Noted: 2025-07-08

## 2025-07-08 PROBLEM — Z87.42 HISTORY OF ABNORMAL CERVICAL PAPANICOLAOU SMEAR: Status: RESOLVED | Noted: 2021-07-01 | Resolved: 2025-07-08

## 2025-07-08 PROBLEM — K92.1 BLOOD IN STOOL: Status: ACTIVE | Noted: 2025-07-08

## 2025-07-08 LAB
APPEARANCE: CLEAR
BACTERIA: NEGATIVE /HPF
BILIRUBIN URINE: NEGATIVE
BLOOD URINE: NEGATIVE
CAST: 0 /LPF
COLOR: YELLOW
EPITHELIAL CELLS: 0 /HPF
GLUCOSE QUALITATIVE U: NEGATIVE MG/DL
KETONES URINE: NEGATIVE MG/DL
LEUKOCYTE ESTERASE URINE: NEGATIVE
MICROSCOPIC-UA: NORMAL
NITRITE URINE: NEGATIVE
PH URINE: 5.5
PROTEIN URINE: NEGATIVE MG/DL
RED BLOOD CELLS URINE: 0 /HPF
SPECIFIC GRAVITY URINE: 1.02
URINE CYTOLOGY: NORMAL
UROBILINOGEN URINE: 0.2 MG/DL
WHITE BLOOD CELLS URINE: 0 /HPF

## 2025-07-08 PROCEDURE — 99459 PELVIC EXAMINATION: CPT

## 2025-07-08 PROCEDURE — 99213 OFFICE O/P EST LOW 20 MIN: CPT

## 2025-07-09 LAB — BACTERIA UR CULT: NORMAL

## 2025-07-16 LAB — CYTOLOGY CVX/VAG DOC THIN PREP: ABNORMAL

## 2025-07-16 RX ORDER — ESTRADIOL 0.1 MG/G
0.1 CREAM VAGINAL
Qty: 1 | Refills: 3 | Status: ACTIVE | COMMUNITY
Start: 2025-07-16 | End: 1900-01-01

## 2025-08-04 ENCOUNTER — APPOINTMENT (OUTPATIENT)
Dept: OBGYN | Facility: CLINIC | Age: 85
End: 2025-08-04

## 2025-08-04 DIAGNOSIS — N32.81 OVERACTIVE BLADDER: ICD-10-CM

## 2025-08-04 DIAGNOSIS — N39.46 MIXED INCONTINENCE: ICD-10-CM

## 2025-08-04 DIAGNOSIS — N95.2 POSTMENOPAUSAL ATROPHIC VAGINITIS: ICD-10-CM

## 2025-08-04 DIAGNOSIS — N81.9 FEMALE GENITAL PROLAPSE, UNSPECIFIED: ICD-10-CM

## 2025-08-04 PROCEDURE — 99213 OFFICE O/P EST LOW 20 MIN: CPT | Mod: 25

## 2025-08-04 PROCEDURE — 64566 NEUROELTRD STIM POST TIBIAL: CPT

## 2025-08-06 ENCOUNTER — APPOINTMENT (OUTPATIENT)
Dept: COLORECTAL SURGERY | Facility: CLINIC | Age: 85
End: 2025-08-06

## 2025-08-06 VITALS
DIASTOLIC BLOOD PRESSURE: 65 MMHG | WEIGHT: 169 LBS | BODY MASS INDEX: 27.16 KG/M2 | SYSTOLIC BLOOD PRESSURE: 130 MMHG | TEMPERATURE: 97.88 F | OXYGEN SATURATION: 98 % | HEIGHT: 66 IN | HEART RATE: 78 BPM

## 2025-08-06 PROCEDURE — 45330 DIAGNOSTIC SIGMOIDOSCOPY: CPT

## 2025-08-06 PROCEDURE — 99203 OFFICE O/P NEW LOW 30 MIN: CPT | Mod: 25

## 2025-08-07 ENCOUNTER — APPOINTMENT (OUTPATIENT)
Dept: ULTRASOUND IMAGING | Facility: IMAGING CENTER | Age: 85
End: 2025-08-07
Payer: MEDICARE

## 2025-08-07 ENCOUNTER — OUTPATIENT (OUTPATIENT)
Dept: OUTPATIENT SERVICES | Facility: HOSPITAL | Age: 85
LOS: 1 days | End: 2025-08-07
Payer: MEDICARE

## 2025-08-07 DIAGNOSIS — N81.9 FEMALE GENITAL PROLAPSE, UNSPECIFIED: ICD-10-CM

## 2025-08-07 DIAGNOSIS — M20.12 HALLUX VALGUS (ACQUIRED), LEFT FOOT: Chronic | ICD-10-CM

## 2025-08-07 DIAGNOSIS — R52 PAIN, UNSPECIFIED: Chronic | ICD-10-CM

## 2025-08-07 DIAGNOSIS — Z98.890 OTHER SPECIFIED POSTPROCEDURAL STATES: Chronic | ICD-10-CM

## 2025-08-07 PROCEDURE — 76830 TRANSVAGINAL US NON-OB: CPT | Mod: 26

## 2025-08-07 PROCEDURE — 76856 US EXAM PELVIC COMPLETE: CPT

## 2025-08-07 PROCEDURE — 76830 TRANSVAGINAL US NON-OB: CPT

## 2025-08-07 PROCEDURE — 76770 US EXAM ABDO BACK WALL COMP: CPT | Mod: 26

## 2025-08-07 PROCEDURE — 76856 US EXAM PELVIC COMPLETE: CPT | Mod: 26

## 2025-08-07 PROCEDURE — 76770 US EXAM ABDO BACK WALL COMP: CPT

## 2025-08-18 ENCOUNTER — OFFICE (OUTPATIENT)
Dept: URBAN - METROPOLITAN AREA CLINIC 35 | Facility: CLINIC | Age: 85
Setting detail: OPHTHALMOLOGY
End: 2025-08-18
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
## 2025-08-18 PROCEDURE — NO SHOW FE NO SHOW FEE: Performed by: OPHTHALMOLOGY

## 2025-08-27 ENCOUNTER — DOCTOR'S OFFICE (OUTPATIENT)
Facility: LOCATION | Age: 85
Setting detail: OPHTHALMOLOGY
End: 2025-08-27
Payer: MEDICARE

## 2025-08-27 DIAGNOSIS — H16.222: ICD-10-CM

## 2025-08-27 DIAGNOSIS — H43.812: ICD-10-CM

## 2025-08-27 DIAGNOSIS — H35.371: ICD-10-CM

## 2025-08-27 DIAGNOSIS — H11.153: ICD-10-CM

## 2025-08-27 DIAGNOSIS — Z96.1: ICD-10-CM

## 2025-08-27 DIAGNOSIS — H02.401: ICD-10-CM

## 2025-08-27 DIAGNOSIS — H01.004: ICD-10-CM

## 2025-08-27 DIAGNOSIS — H00.022: ICD-10-CM

## 2025-08-27 DIAGNOSIS — H01.001: ICD-10-CM

## 2025-08-27 DIAGNOSIS — H00.025: ICD-10-CM

## 2025-08-27 DIAGNOSIS — H35.40: ICD-10-CM

## 2025-08-27 PROCEDURE — 92014 COMPRE OPH EXAM EST PT 1/>: CPT | Performed by: OPHTHALMOLOGY

## 2025-08-27 ASSESSMENT — REFRACTION_MANIFEST
OS_AXIS: 180
OD_VA1: 20/20
OS_SPHERE: +2.25
OD_CYLINDER: +0.50
OS_AXIS: 010
OS_CYLINDER: +0.25
OS_ADD: +2.25
OD_CYLINDER: SPHERE
OD_SPHERE: PLANO
OD_VA1: 20/25
OS_SPHERE: +2.25
OD_CYLINDER: +0.50
OD_SPHERE: -0.25
OS_SPHERE: -0.25
OS_CYLINDER: +1.00
OS_CYLINDER: +1.25
OD_AXIS: 120
OS_AXIS: 060
OD_ADD: +2.50
OS_VA1: 20/25+
OD_AXIS: 120
OD_SPHERE: -0.50
OS_VA1: 20/20-

## 2025-08-27 ASSESSMENT — REFRACTION_AUTOREFRACTION
OS_CYLINDER: +0.50
OD_AXIS: 165
OS_SPHERE: 0.00
OD_SPHERE: 0.00
OS_AXIS: 130
OD_CYLINDER: +0.50

## 2025-08-27 ASSESSMENT — VISUAL ACUITY
OD_BCVA: 20/25+2
OS_BCVA: 20/20

## 2025-08-27 ASSESSMENT — REFRACTION_CURRENTRX
OS_VPRISM_DIRECTION: SV
OD_CYLINDER: +0.50
OD_OVR_VA: 20/
OS_OVR_VA: 20/
OS_VPRISM_DIRECTION: SV
OD_CYLINDER: -0.50
OS_AXIS: 090
OD_AXIS: 118
OD_SPHERE: +1.25
OS_OVR_VA: 20/
OD_VPRISM_DIRECTION: SV
OD_SPHERE: +2.00
OD_VPRISM_DIRECTION: SV
OS_CYLINDER: +1.00
OS_AXIS: 005
OS_SPHERE: +1.25
OS_CYLINDER: -1.25
OS_SPHERE: +5.25
OD_AXIS: 025
OD_OVR_VA: 20/
OS_OVR_VA: 20/
OD_SPHERE: PLANO
OS_SPHERE: +3.50
OD_OVR_VA: 20/

## 2025-08-27 ASSESSMENT — TONOMETRY
OD_IOP_MMHG: 12
OS_IOP_MMHG: 12

## 2025-08-27 ASSESSMENT — KERATOMETRY
METHOD_AUTO_MANUAL: AUTO
OD_K2POWER_DIOPTERS: 44.75
OD_K1POWER_DIOPTERS: 43.50
OS_K1POWER_DIOPTERS: 43.75
OD_AXISANGLE_DEGREES: 008
OS_K2POWER_DIOPTERS: 45.00
OS_AXISANGLE_DEGREES: 167

## 2025-08-27 ASSESSMENT — CONFRONTATIONAL VISUAL FIELD TEST (CVF)
OD_FINDINGS: FULL
OS_FINDINGS: FULL

## 2025-08-27 ASSESSMENT — LID EXAM ASSESSMENTS
OD_BLEPHARITIS: RUL T 1+
OS_BLEPHARITIS: LUL T 1+
OS_MEIBOMITIS: LLL
OD_MEIBOMITIS: RLL

## 2025-08-27 ASSESSMENT — CORNEAL DYSTROPHY - POSTERIOR: OS_POSTERIORDYSTROPHY: 1+ GUTTATA

## 2025-08-27 ASSESSMENT — LID POSITION - PTOSIS: OD_PTOSIS: RUL

## 2025-08-27 ASSESSMENT — CORNEAL DYSTROPHY: OS_DYSTROPHY: 1+ MAPS

## 2025-09-15 ENCOUNTER — APPOINTMENT (OUTPATIENT)
Dept: OBGYN | Facility: CLINIC | Age: 85
End: 2025-09-15
Payer: MEDICARE

## 2025-09-15 VITALS
DIASTOLIC BLOOD PRESSURE: 85 MMHG | HEIGHT: 66 IN | HEART RATE: 72 BPM | SYSTOLIC BLOOD PRESSURE: 149 MMHG | BODY MASS INDEX: 27.16 KG/M2 | WEIGHT: 169 LBS

## 2025-09-15 DIAGNOSIS — N32.81 OVERACTIVE BLADDER: ICD-10-CM

## 2025-09-15 DIAGNOSIS — N81.9 FEMALE GENITAL PROLAPSE, UNSPECIFIED: ICD-10-CM

## 2025-09-15 DIAGNOSIS — N95.2 POSTMENOPAUSAL ATROPHIC VAGINITIS: ICD-10-CM

## 2025-09-15 DIAGNOSIS — B97.7 PAPILLOMAVIRUS AS THE CAUSE OF DISEASES CLASSIFIED ELSEWHERE: ICD-10-CM

## 2025-09-15 LAB
BILIRUB UR QL STRIP: NORMAL
CLARITY UR: NORMAL
COLLECTION METHOD: NORMAL
GLUCOSE UR-MCNC: NORMAL
HCG UR QL: 0.2 EU/DL
HGB UR QL STRIP.AUTO: NORMAL
KETONES UR-MCNC: NORMAL
LEUKOCYTE ESTERASE UR QL STRIP: NORMAL
NITRITE UR QL STRIP: NORMAL
PH UR STRIP: 5
PROT UR STRIP-MCNC: NORMAL
SP GR UR STRIP: 1.02

## 2025-09-15 PROCEDURE — 99213 OFFICE O/P EST LOW 20 MIN: CPT | Mod: 25

## 2025-09-15 PROCEDURE — 64566 NEUROELTRD STIM POST TIBIAL: CPT

## 2025-09-16 LAB
APPEARANCE: CLEAR
BACTERIA: NEGATIVE /HPF
BILIRUBIN URINE: NEGATIVE
BLOOD URINE: NEGATIVE
CAST: 0 /LPF
COLOR: YELLOW
EPITHELIAL CELLS: 1 /HPF
GLUCOSE QUALITATIVE U: NEGATIVE MG/DL
KETONES URINE: NEGATIVE MG/DL
LEUKOCYTE ESTERASE URINE: NEGATIVE
MICROSCOPIC-UA: NORMAL
NITRITE URINE: NEGATIVE
PH URINE: 5.5
PROTEIN URINE: NEGATIVE MG/DL
RED BLOOD CELLS URINE: 0 /HPF
SPECIFIC GRAVITY URINE: 1.02
UROBILINOGEN URINE: 0.2 MG/DL
WHITE BLOOD CELLS URINE: 0 /HPF

## 2025-09-17 ENCOUNTER — APPOINTMENT (OUTPATIENT)
Dept: PODIATRY | Facility: CLINIC | Age: 85
End: 2025-09-17

## 2025-09-17 DIAGNOSIS — M79.675 PAIN IN RIGHT TOE(S): ICD-10-CM

## 2025-09-17 DIAGNOSIS — M79.674 PAIN IN RIGHT TOE(S): ICD-10-CM

## 2025-09-17 DIAGNOSIS — B35.1 TINEA UNGUIUM: ICD-10-CM

## 2025-09-17 LAB
BACTERIA UR CULT: NORMAL
URINE CYTOLOGY: NORMAL

## 2025-09-17 RX ORDER — KETOCONAZOLE 20 MG/G
2 CREAM TOPICAL TWICE DAILY
Qty: 1 | Refills: 3 | Status: ACTIVE | COMMUNITY
Start: 2025-09-17 | End: 1900-01-01

## 2025-09-25 LAB
CALCOFLUOR WHITE SPEC: NORMAL
CORE LAB BIOPSY: NORMAL

## 2025-09-27 LAB — FUNGUS SKIN CULT: NORMAL

## (undated) DEVICE — SUT VICRYL 3-0 18" PS-2 UNDYED

## (undated) DEVICE — DRSG WEBRIL 4"

## (undated) DEVICE — DRSG ACE BANDAGE 4" NS

## (undated) DEVICE — VENODYNE/SCD SLEEVE CALF BARIATRIC

## (undated) DEVICE — PREP CHLORAPREP HI-LITE ORANGE 26ML

## (undated) DEVICE — SYR LUER LOK 10CC

## (undated) DEVICE — NDL HYPO SAFE 18G X 1.5" (PINK)

## (undated) DEVICE — DRILL CANN QR 1.7MM LONG

## (undated) DEVICE — BEAVER BLADE MINI (ORANGE)

## (undated) DEVICE — DRSG KLING 4"

## (undated) DEVICE — DRSG MASTISOL

## (undated) DEVICE — SUT ETHILON 4-0 18" PS-2

## (undated) DEVICE — TOURNIQUET ESMARK 4"

## (undated) DEVICE — DRSG STOCKINETTE TUBULAR 6"

## (undated) DEVICE — SUT VICRYL 2-0 27" FS-1 UNDYED

## (undated) DEVICE — ELCTR GROUNDING PAD ADULT COVIDIEN

## (undated) DEVICE — SUT VICRYL 2-0 18" TIES UNDYED

## (undated) DEVICE — DRILL CANN QR 2.3MM

## (undated) DEVICE — DRSG CURITY GAUZE SPONGE 4 X 4" 12-PLY

## (undated) DEVICE — DRSG KLING 2"

## (undated) DEVICE — BUR STRYKER OVAL 4 X 38MM

## (undated) DEVICE — LABELS BLANK W PEN

## (undated) DEVICE — SUT MONOCRYL 4-0 27" PS-2 UNDYED

## (undated) DEVICE — GLV 7.5 PROTEXIS (WHITE)

## (undated) DEVICE — POSITIONER STRAP ARMBOARD VELCRO TS-30

## (undated) DEVICE — NDL HYPO REGULAR BEVEL 25G X 1.5" (BLUE)

## (undated) DEVICE — WARMING BLANKET UPPER ADULT

## (undated) DEVICE — VENODYNE/SCD SLEEVE CALF MEDIUM

## (undated) DEVICE — FRAZIER SUCTION TIP 8FR

## (undated) DEVICE — SAW BLADE MICROAIRE SAGITTAL 9.4MMX25.4MMX0.6MM

## (undated) DEVICE — SUT ETHILON 5-0 18" PS-2

## (undated) DEVICE — SUT VICRYL 3-0 18" TIES UNDYED

## (undated) DEVICE — VENODYNE/SCD SLEEVE CALF LARGE

## (undated) DEVICE — TOURNIQUET CUFF 18" DUAL PORT SINGLE BLADDER LUER LOCK (BLACK)

## (undated) DEVICE — PACK LIJ BASIC ORTHO

## (undated) DEVICE — DRSG STERISTRIPS 0.25 X 3"

## (undated) DEVICE — YELLOW PIN COVER

## (undated) DEVICE — SUT VICRYL 4-0 18" PS-2 UNDYED

## (undated) DEVICE — SOL IRR POUR NS 0.9% 500ML

## (undated) DEVICE — CANISTER DISPOSABLE THIN WALL 3000CC

## (undated) DEVICE — BLADE SURGICAL #15 CARBON